# Patient Record
Sex: FEMALE | Race: OTHER | Employment: FULL TIME | URBAN - METROPOLITAN AREA
[De-identification: names, ages, dates, MRNs, and addresses within clinical notes are randomized per-mention and may not be internally consistent; named-entity substitution may affect disease eponyms.]

---

## 2020-09-24 LAB
HBSAG, EXTERNAL: NORMAL
HBSAG, EXTERNAL: NORMAL
HEPATITIS C AB,   EXT: NORMAL
HGB, EXTERNAL: NORMAL
HIV, EXTERNAL: NORMAL
RPR, EXTERNAL: NORMAL
RUBELLA, EXTERNAL: NORMAL
TYPE, ABO & RH, EXTERNAL: NORMAL

## 2020-12-03 PROBLEM — O09.812 HIGH RISK PREGNANCY DUE TO ASSISTED REPRODUCTIVE TECHNOLOGY IN SECOND TRIMESTER: Status: ACTIVE | Noted: 2020-12-03

## 2020-12-03 PROBLEM — O99.281 HYPOTHYROIDISM AFFECTING PREGNANCY IN FIRST TRIMESTER: Status: ACTIVE | Noted: 2020-12-03

## 2020-12-03 PROBLEM — E03.9 HYPOTHYROIDISM AFFECTING PREGNANCY IN FIRST TRIMESTER: Status: ACTIVE | Noted: 2020-12-03

## 2020-12-04 PROBLEM — O99.282 HYPOTHYROIDISM AFFECTING PREGNANCY IN SECOND TRIMESTER: Status: ACTIVE | Noted: 2020-12-03

## 2020-12-07 PROBLEM — O44.02 PLACENTA PREVIA IN SECOND TRIMESTER: Status: ACTIVE | Noted: 2020-12-07

## 2020-12-07 PROBLEM — Z14.8 CARRIER OF FRAGILE X CHROMOSOME: Status: ACTIVE | Noted: 2020-12-07

## 2020-12-07 PROBLEM — O09.522 AMA (ADVANCED MATERNAL AGE) MULTIGRAVIDA 35+, SECOND TRIMESTER: Status: ACTIVE | Noted: 2020-12-07

## 2020-12-07 PROBLEM — O09.512 ADVANCED MATERNAL AGE, 1ST PREGNANCY, SECOND TRIMESTER: Status: ACTIVE | Noted: 2020-12-07

## 2021-01-21 PROBLEM — F32.A DEPRESSION AFFECTING PREGNANCY IN SECOND TRIMESTER, ANTEPARTUM: Status: ACTIVE | Noted: 2021-01-21

## 2021-01-21 PROBLEM — O99.342 DEPRESSION AFFECTING PREGNANCY IN SECOND TRIMESTER, ANTEPARTUM: Status: ACTIVE | Noted: 2021-01-21

## 2021-02-14 ENCOUNTER — HOSPITAL ENCOUNTER (INPATIENT)
Age: 46
LOS: 7 days | Discharge: ANOTHER HOSPITAL | DRG: 771 | End: 2021-02-21
Attending: OBSTETRICS & GYNECOLOGY | Admitting: OBSTETRICS & GYNECOLOGY
Payer: OTHER GOVERNMENT

## 2021-02-14 DIAGNOSIS — O44.13 PLACENTA PREVIA WITH HEMORRHAGE IN THIRD TRIMESTER: ICD-10-CM

## 2021-02-14 PROBLEM — O44.10 ANTEPARTUM HEMORRHAGE FROM PLACENTA PREVIA: Status: ACTIVE | Noted: 2021-02-14

## 2021-02-14 PROBLEM — O46.93 VAGINAL BLEEDING IN PREGNANCY, THIRD TRIMESTER: Status: ACTIVE | Noted: 2021-02-14

## 2021-02-14 LAB
ABO + RH BLD: NORMAL
ANTIBODY SCREEN, EXTERNAL: NORMAL
BLOOD GROUP ANTIBODIES SERPL: NORMAL
ERYTHROCYTE [DISTWIDTH] IN BLOOD BY AUTOMATED COUNT: 13.6 % (ref 11.9–14.6)
HCT VFR BLD AUTO: 34.2 % (ref 35.8–46.3)
HGB BLD-MCNC: 11.8 G/DL (ref 11.7–15.4)
MCH RBC QN AUTO: 28.7 PG (ref 26.1–32.9)
MCHC RBC AUTO-ENTMCNC: 34.5 G/DL (ref 31.4–35)
MCV RBC AUTO: 83.2 FL (ref 79.6–97.8)
NRBC # BLD: 0 K/UL (ref 0–0.2)
PLATELET # BLD AUTO: 400 K/UL (ref 150–450)
PMV BLD AUTO: 9.8 FL (ref 9.4–12.3)
RBC # BLD AUTO: 4.11 M/UL (ref 4.05–5.2)
SPECIMEN EXP DATE BLD: NORMAL
TYPE, ABO & RH, EXTERNAL: NORMAL
WBC # BLD AUTO: 10.9 K/UL (ref 4.3–11.1)

## 2021-02-14 PROCEDURE — 59025 FETAL NON-STRESS TEST: CPT

## 2021-02-14 PROCEDURE — 99255 IP/OBS CONSLTJ NEW/EST HI 80: CPT | Performed by: OBSTETRICS & GYNECOLOGY

## 2021-02-14 PROCEDURE — 85027 COMPLETE CBC AUTOMATED: CPT

## 2021-02-14 PROCEDURE — 74011250637 HC RX REV CODE- 250/637: Performed by: OBSTETRICS & GYNECOLOGY

## 2021-02-14 PROCEDURE — 86901 BLOOD TYPING SEROLOGIC RH(D): CPT

## 2021-02-14 PROCEDURE — 99218 HC RM OBSERVATION: CPT

## 2021-02-14 PROCEDURE — 96372 THER/PROPH/DIAG INJ SC/IM: CPT

## 2021-02-14 PROCEDURE — 81002 URINALYSIS NONAUTO W/O SCOPE: CPT | Performed by: OBSTETRICS & GYNECOLOGY

## 2021-02-14 PROCEDURE — 96360 HYDRATION IV INFUSION INIT: CPT

## 2021-02-14 PROCEDURE — 74011250636 HC RX REV CODE- 250/636: Performed by: OBSTETRICS & GYNECOLOGY

## 2021-02-14 PROCEDURE — 2709999900 HC NON-CHARGEABLE SUPPLY

## 2021-02-14 PROCEDURE — 36415 COLL VENOUS BLD VENIPUNCTURE: CPT

## 2021-02-14 PROCEDURE — 76815 OB US LIMITED FETUS(S): CPT

## 2021-02-14 PROCEDURE — 65270000029 HC RM PRIVATE

## 2021-02-14 PROCEDURE — 99285 EMERGENCY DEPT VISIT HI MDM: CPT | Performed by: OBSTETRICS & GYNECOLOGY

## 2021-02-14 PROCEDURE — 75810000275 HC EMERGENCY DEPT VISIT NO LEVEL OF CARE

## 2021-02-14 RX ORDER — ONDANSETRON 2 MG/ML
4 INJECTION INTRAMUSCULAR; INTRAVENOUS
Status: DISCONTINUED | OUTPATIENT
Start: 2021-02-14 | End: 2021-02-21

## 2021-02-14 RX ORDER — NIFEDIPINE 10 MG/1
10 CAPSULE ORAL
Status: COMPLETED | OUTPATIENT
Start: 2021-02-14 | End: 2021-02-14

## 2021-02-14 RX ORDER — SERTRALINE HYDROCHLORIDE 100 MG/1
100 TABLET, FILM COATED ORAL
Status: DISCONTINUED | OUTPATIENT
Start: 2021-02-14 | End: 2021-02-15

## 2021-02-14 RX ORDER — SODIUM CHLORIDE, SODIUM LACTATE, POTASSIUM CHLORIDE, CALCIUM CHLORIDE 600; 310; 30; 20 MG/100ML; MG/100ML; MG/100ML; MG/100ML
500 INJECTION, SOLUTION INTRAVENOUS CONTINUOUS
Status: DISPENSED | OUTPATIENT
Start: 2021-02-14 | End: 2021-02-14

## 2021-02-14 RX ORDER — ACETAMINOPHEN 325 MG/1
650 TABLET ORAL
Status: DISCONTINUED | OUTPATIENT
Start: 2021-02-14 | End: 2021-02-21

## 2021-02-14 RX ORDER — CALCIUM CARBONATE 200(500)MG
1000 TABLET,CHEWABLE ORAL ONCE
Status: COMPLETED | OUTPATIENT
Start: 2021-02-14 | End: 2021-02-14

## 2021-02-14 RX ORDER — SODIUM CHLORIDE 0.9 % (FLUSH) 0.9 %
5-40 SYRINGE (ML) INJECTION EVERY 8 HOURS
Status: DISCONTINUED | OUTPATIENT
Start: 2021-02-14 | End: 2021-02-21

## 2021-02-14 RX ORDER — GUAIFENESIN 100 MG/5ML
162 LIQUID (ML) ORAL DAILY
Status: DISCONTINUED | OUTPATIENT
Start: 2021-02-15 | End: 2021-02-21

## 2021-02-14 RX ORDER — NIFEDIPINE 10 MG/1
10 CAPSULE ORAL 2 TIMES DAILY
Status: DISCONTINUED | OUTPATIENT
Start: 2021-02-14 | End: 2021-02-14

## 2021-02-14 RX ORDER — LORAZEPAM 1 MG/1
1 TABLET ORAL
Status: DISCONTINUED | OUTPATIENT
Start: 2021-02-14 | End: 2021-02-21

## 2021-02-14 RX ORDER — LEVOTHYROXINE SODIUM 100 UG/1
100 TABLET ORAL
Status: DISCONTINUED | OUTPATIENT
Start: 2021-02-15 | End: 2021-02-15

## 2021-02-14 RX ORDER — MELATONIN
2000 DAILY
Status: DISCONTINUED | OUTPATIENT
Start: 2021-02-15 | End: 2021-02-21

## 2021-02-14 RX ORDER — NIFEDIPINE 10 MG/1
20 CAPSULE ORAL EVERY 6 HOURS
Status: DISCONTINUED | OUTPATIENT
Start: 2021-02-14 | End: 2021-02-17

## 2021-02-14 RX ORDER — SODIUM CHLORIDE 0.9 % (FLUSH) 0.9 %
5-40 SYRINGE (ML) INJECTION AS NEEDED
Status: DISCONTINUED | OUTPATIENT
Start: 2021-02-14 | End: 2021-02-21

## 2021-02-14 RX ORDER — SODIUM CHLORIDE, SODIUM LACTATE, POTASSIUM CHLORIDE, CALCIUM CHLORIDE 600; 310; 30; 20 MG/100ML; MG/100ML; MG/100ML; MG/100ML
500 INJECTION, SOLUTION INTRAVENOUS
Status: COMPLETED | OUTPATIENT
Start: 2021-02-14 | End: 2021-02-14

## 2021-02-14 RX ORDER — CALCIUM CARBONATE 200(500)MG
1000 TABLET,CHEWABLE ORAL DAILY
Status: DISCONTINUED | OUTPATIENT
Start: 2021-02-15 | End: 2021-02-21

## 2021-02-14 RX ORDER — BETAMETHASONE SODIUM PHOSPHATE AND BETAMETHASONE ACETATE 3; 3 MG/ML; MG/ML
12 INJECTION, SUSPENSION INTRA-ARTICULAR; INTRALESIONAL; INTRAMUSCULAR; SOFT TISSUE EVERY 24 HOURS
Status: COMPLETED | OUTPATIENT
Start: 2021-02-14 | End: 2021-02-15

## 2021-02-14 RX ORDER — DEXTROSE, SODIUM CHLORIDE, SODIUM LACTATE, POTASSIUM CHLORIDE, AND CALCIUM CHLORIDE 5; .6; .31; .03; .02 G/100ML; G/100ML; G/100ML; G/100ML; G/100ML
125 INJECTION, SOLUTION INTRAVENOUS CONTINUOUS
Status: DISCONTINUED | OUTPATIENT
Start: 2021-02-14 | End: 2021-02-14

## 2021-02-14 RX ORDER — DOCUSATE SODIUM 100 MG/1
100 CAPSULE, LIQUID FILLED ORAL 2 TIMES DAILY
Status: DISCONTINUED | OUTPATIENT
Start: 2021-02-14 | End: 2021-02-21

## 2021-02-14 RX ADMIN — DOCUSATE SODIUM 100 MG: 100 CAPSULE, LIQUID FILLED ORAL at 12:20

## 2021-02-14 RX ADMIN — SODIUM CHLORIDE, SODIUM LACTATE, POTASSIUM CHLORIDE, CALCIUM CHLORIDE, AND DEXTROSE MONOHYDRATE 125 ML/HR: 600; 310; 30; 20; 5 INJECTION, SOLUTION INTRAVENOUS at 08:41

## 2021-02-14 RX ADMIN — NIFEDIPINE 20 MG: 10 CAPSULE ORAL at 22:28

## 2021-02-14 RX ADMIN — NIFEDIPINE 10 MG: 10 CAPSULE ORAL at 08:32

## 2021-02-14 RX ADMIN — NIFEDIPINE 10 MG: 10 CAPSULE ORAL at 12:20

## 2021-02-14 RX ADMIN — NIFEDIPINE 10 MG: 10 CAPSULE ORAL at 01:36

## 2021-02-14 RX ADMIN — LORAZEPAM 1 MG: 1 TABLET ORAL at 01:36

## 2021-02-14 RX ADMIN — SODIUM CHLORIDE, SODIUM LACTATE, POTASSIUM CHLORIDE, AND CALCIUM CHLORIDE 500 ML/HR: 600; 310; 30; 20 INJECTION, SOLUTION INTRAVENOUS at 01:40

## 2021-02-14 RX ADMIN — CALCIUM CARBONATE 1000 MG: 500 TABLET, CHEWABLE ORAL at 19:09

## 2021-02-14 RX ADMIN — DOCUSATE SODIUM 100 MG: 100 CAPSULE, LIQUID FILLED ORAL at 19:09

## 2021-02-14 RX ADMIN — SODIUM CHLORIDE, SODIUM LACTATE, POTASSIUM CHLORIDE, AND CALCIUM CHLORIDE 500 ML: 600; 310; 30; 20 INJECTION, SOLUTION INTRAVENOUS at 08:41

## 2021-02-14 RX ADMIN — NIFEDIPINE 20 MG: 10 CAPSULE ORAL at 15:54

## 2021-02-14 RX ADMIN — SERTRALINE HYDROCHLORIDE 100 MG: 100 TABLET ORAL at 22:28

## 2021-02-14 RX ADMIN — BETAMETHASONE SODIUM PHOSPHATE AND BETAMETHASONE ACETATE 12 MG: 3; 3 INJECTION, SUSPENSION INTRA-ARTICULAR; INTRALESIONAL; INTRAMUSCULAR at 01:36

## 2021-02-14 NOTE — CONSULTS
Neonatology Prenatal Consult:    Prenatal Consult was requested by the obstetrician. Obstetrical Note:  Medical record and notes reviewed. Obstetrical Findings:      Mother's Date of Admission: 2021 12:25 AM   Age: 39 y.o.  BETZY: Estimated Date of Delivery: 21  Gestation by dates: 29w3d   Pregnancy:   Membrane status: Membrane Status: Intact      Social History     Socioeconomic History    Marital status:      Spouse name: Not on file    Number of children: Not on file    Years of education: Not on file    Highest education level: Not on file   Tobacco Use    Smoking status: Former Smoker     Packs/day: 0.25     Quit date: 2011     Years since quittin.4    Smokeless tobacco: Never Used   Substance and Sexual Activity    Alcohol use: Not Currently     Comment: occ    Drug use: No    Sexual activity: Yes     Partners: Male     Birth control/protection: Pill     Current Facility-Administered Medications   Medication Dose Route Frequency    sodium chloride (NS) flush 5-40 mL  5-40 mL IntraVENous Q8H    sodium chloride (NS) flush 5-40 mL  5-40 mL IntraVENous PRN    acetaminophen (TYLENOL) tablet 650 mg  650 mg Oral Q4H PRN    betamethasone (CELESTONE) injection 12 mg  12 mg IntraMUSCular Q24H    ondansetron (ZOFRAN) injection 4 mg  4 mg IntraVENous Q4H PRN    docusate sodium (COLACE) capsule 100 mg  100 mg Oral BID    LORazepam (ATIVAN) tablet 1 mg  1 mg Oral QHS PRN    [START ON 2/15/2021] levothyroxine (SYNTHROID) tablet 100 mcg  100 mcg Oral 6am    sertraline (ZOLOFT) tablet 100 mg  100 mg Oral QHS    NIFEdipine (PROCARDIA) capsule 20 mg  20 mg Oral Q6H     Patient Active Problem List    Diagnosis Date Noted    Vaginal bleeding in pregnancy, third trimester 2021    Placenta previa with hemorrhage in third trimester 2021    Antepartum hemorrhage from placenta previa 2021    Depression affecting pregnancy in second trimester, antepartum 2021    AMA (advanced maternal age) multigravida 33+, second trimester 2020    Placenta previa in second trimester 2020    Hypothyroidism affecting pregnancy in second trimester 2020    High risk pregnancy due to assisted reproductive technology in second trimester 2020       Lab Results   Component Value Date/Time    ABO/Rh(D) A POSITIVE 2021 01:16 AM    Antibody screen NEG 2021 01:16 AM        Items below were discussed with the parents:      Neonatology coverage reviewed. Survival is very good to excellent. This improves with advancing gestational age. Expected LOS, dependent on gestational age and maturity skills reviewed.  resuscitation team attendance reviewed. Admissions procedures were explained. Family visitation policy were explained. RDS, at some risk. This risk decreases with advancing gestational age. Management of RDS was reviewed. Sepsis evaluation was explained. Feeding techniques reviewed. Mother plans to provide breast milk, yes. The benefits of breast milk were discussed in detail, and breast milk feedings is strongly recommended. Lactation services were also reviewed. Gut problems, NEC and infections are lessened with breast milk feedings. Significant IVH and ROP are at a low risk as compared to the extreme  gestation. This risk decreases with advancing gestational age. School readiness and learning problems are at a low but increased risk as compared to the full term gestation. Asthma-like problems later in life is at a low but increased risk as compared to term gestation. An increased risk is found with a family history or with smoking. Significant morbidity or complications are a low occurrence for a \"late \" , but with the potential for an extended hospital stay because of temperature maintenance and poor feeding skills. If male , have discussed potential for circumcision.  Risks and benefits explained. Family advised to think carefully about this procedure before consenting. SIDS and safe infant sleep practices were reviewed. Family advised to maintain their flu and pertussis vaccinations. Local or primary pediatrician: to be determined. Recommendations: The state  regionalization guidelines were reviewed. The timing and place of delivery is determined by the obstetrical staff. The subsequent appropriate level of  care is determined by the  staff. It is appropriate for the infant to be receive care here in our  Care Unit, if after evaluation of the  infant, it is determined that greater than 32 weeks gestation and greater than 1500 gm, or that a higher level of care is not required, or with consultation with the level III  C. If infant needs NCU care, then will plan to admit to the neonatology service. Attestation:     Total consultation time was 40 minutes with over 50% of the total time was spent in counseling or coordination of care. This included prognosis, risks and benefits of management (treatment) options, importance of compliance with chosen management (treatment) options, and patient and family education.         Signed: Cathy Valdes MD  Today's Date: 2021

## 2021-02-14 NOTE — PROGRESS NOTES
This note also relates to the following rows which could not be included:  Pulse (Heart Rate) - Cannot attach notes to unvalidated device data  BP - Cannot attach notes to unvalidated device data  MAP (Monitor) - Cannot attach notes to unvalidated device data       02/14/21 1221   Maternal Vital Signs   Temp 98.4 °F (36.9 °C)   Temp Source Oral

## 2021-02-14 NOTE — PROGRESS NOTES
RN to bedside. Pt OOB to toilet. States she can still feel the tightening in her abdomen but that it is very infrequent now and much less noticeable. Pt and SO denies needs and concerns at this time. Encouraged to call out PRN. Pt voiced understanding.

## 2021-02-14 NOTE — PROGRESS NOTES
02/14/21 1421   Maternal Vital Signs   Temp 98 °F (36.7 °C)   Temp Source Oral   Pulse (Heart Rate) 86   BP 99/62   MAP (Monitor) 75   MAP (Calculated) 74   RN @ bedside. Pt states cramping has decreased and when she feels them they are \"not as strong. \" Denies vaginal bleeding at this time.

## 2021-02-14 NOTE — PROGRESS NOTES
0805 EFM applied to soft non-tender abd. Fetal movement present @ time of application. Within 5 min, notable contractions when pt is c/o \"hardeding of the abdomen. \"   Elijah Zuluaga called and updated on contractions. Telephone orders to start D5LR @ 125/hr, 500 cc LR bolus, 10 mg Nifedipine, and NPO status. POC discussed with pt and SO. Both expressed understanding and questions were answered. Pt and SO question if pt would be able to get a note to excuse her from daily labor of work and have her work remotely from home. Mentioned to Dr. Gurjit Zuluaga for her to f/u on. Pt encouraged to call out PRN. Voiced understanding. [address] [facility] removed. TOCO remains in place to monitor contractions.  baseline. Somewhat broken tracings due to continuous fetal activity. RN @ bedside many times to adjust US but fetal movement nearly immediately makes tracing difficult.

## 2021-02-14 NOTE — PROGRESS NOTES
High Risk Obstetrics Progress Note    Name: Ned Grajeda MRN: [id number]  SSN: [id number]    YOB: 1975  Age: 39 y.o. Sex: female      Subjective:      LOS: 0 days    Estimated Date of Delivery: 21   Gestational Age Today: 29w3d     Patient admitted for vaginal bleeding. States she does have mild vaginal bleeding. Pt has h/o of placenta previa. Objective:     Vitals:  Blood pressure (!) 97/53, pulse 93, temperature 98.3 °F (36.8 °C), resp. rate 16, height 5' 5\" (1.651 m), weight 79.4 kg (175 lb), last menstrual period 2020. Temp (24hrs), Av.6 °F (37 °C), Min:98.3 °F (36.8 °C), Max:98.9 °F (14.6 °C)    Systolic (89VNM), QCH:557 , Min:97 , KMQ:788      Diastolic (82SSA), RTQ:55, Min:53, Max:74       Intake and Output:     Date 21 0700 - 02/15/21 0659   Shift 0177-0132 8202-2664 8859-1807 24 Hour Total   INTAKE   I.V.(mL/kg/hr) 500   500   Shift Total(mL/kg) 500(6.3)   500(6.3)   OUTPUT   Shift Total(mL/kg)       Weight (kg) 79.4 79.4 79.4 79.4       Physical Exam:  Patient without distress.   Heart: Regular rate and rhythm  Lung: clear to auscultation throughout lung fields, no wheezes, no rales, no rhonchi and normal respiratory effort  Abdomen: soft, nontender  Fundus: soft and non tender       Membranes:  Intact    Uterine Activity:  Some irritability    Fetal Heart Rate:  Reactive        Labs:   Recent Results (from the past 36 hour(s))   TYPE & SCREEN    Collection Time: 21  1:16 AM   Result Value Ref Range    Crossmatch Expiration 2021,2359     ABO/Rh(D) A POSITIVE     Antibody screen NEG    CBC W/O DIFF    Collection Time: 21  1:16 AM   Result Value Ref Range    WBC 10.9 4.3 - 11.1 K/uL    RBC 4.11 4.05 - 5.2 M/uL    HGB 11.8 11.7 - 15.4 g/dL    HCT 34.2 (L) 35.8 - 46.3 %    MCV 83.2 79.6 - 97.8 FL    MCH 28.7 26.1 - 32.9 PG    MCHC 34.5 31.4 - 35.0 g/dL    RDW 13.6 11.9 - 14.6 %    PLATELET 757 055 - 458 K/uL    MPV 9.8 9.4 - 12.3 FL    ABSOLUTE NRBC 0.00 0.0 - 0.2 K/uL       Assessment and Plan: Active Problems:    Vaginal bleeding in pregnancy, third trimester (2/14/2021)      Placenta previa with hemorrhage in third trimester (2/14/2021)       Pt with brown blood on pad this am and minimal small (3 -4 in an hour) ctx /irritability. S/p Celestone dose #1. Have given 2 does of Procardia. Will start on maintenance. Discussed that she will need to be tx to S if labor ensues since she is less than 32w. She is stable for now and MFM will be seeing later.

## 2021-02-14 NOTE — CONSULTS
Maternal Fetal Medicine Inpatient Consult Note    Consulting: Dr. Niko Butler    Chief Complaint:  Pregnancy and vaginal bleeding . History of Present Illness: 39 y.o.  at 28w2d gestation who presents with painless bleeding last night. This has stopped, just recent void, no blood or discharge. Having Uterine tightening now, irregular. This am was having regular contractions that responded to IV fluids and Procardia. Patient admitted for placenta previa known placenta posterior placenta previa. She complains of some tightening. Patient denies HA, abdominal pain, vision changes. No regular contractions, LOF, VB. Good FM. Minimal edema. No chest pain or shortness of breath. No significant reflux, nausea, constipation, or other GI complaints. Review of Systems:  A comprehensive review of systems was negative except for that written in the HPI.      History:  OB History    Para Term  AB Living   6       5     SAB TAB Ectopic Molar Multiple Live Births   5                # Outcome Date GA Lbr Teofilo/2nd Weight Sex Delivery Anes PTL Lv   6 Current            5 2017 8w0d             Birth Comments: D&C   4 2016 8w0d             Birth Comments: D&C   3 2015 8w0d             Birth Comments: [place]   2 2014 8w0d             Birth Comments: [place], [place]   1 2013 12w0d             Birth Comments: D&C, [place]       Past Surgical History:   Procedure Laterality Date    APPENDECTOMY,W OTHR C      HX APPENDECTOMY         Past Medical History:   Diagnosis Date    Anemia     iron supplements as a child    Anxiety     was on celexa    Blighted ovum 2011    Hypothyroidism affecting pregnancy in first trimester 12/3/2020    Infertility, female     Psychiatric disorder     depression       Family History   Problem Relation Age of Onset    Diabetes Maternal Grandmother     Stroke Maternal Grandmother        Social History     Socioeconomic History    Marital status:      Spouse name: Not on file    Number of children: Not on file    Years of education: Not on file    Highest education level: Not on file   Occupational History    Not on file   Social Needs    Financial resource strain: Not on file    Food insecurity     Worry: Not on file     Inability: Not on file    Transportation needs     Medical: Not on file     Non-medical: Not on file   Tobacco Use    Smoking status: Former Smoker     Packs/day: 0.25     Quit date: 2011     Years since quittin.4    Smokeless tobacco: Never Used   Substance and Sexual Activity    Alcohol use: Not Currently     Comment: occ    Drug use: No    Sexual activity: Yes     Partners: Male     Birth control/protection: Pill   Lifestyle    Physical activity     Days per week: Not on file     Minutes per session: Not on file    Stress: Not on file   Relationships    Social connections     Talks on phone: Not on file     Gets together: Not on file     Attends Advent service: Not on file     Active member of club or organization: Not on file     Attends meetings of clubs or organizations: Not on file     Relationship status: Not on file    Intimate partner violence     Fear of current or ex partner: Not on file     Emotionally abused: Not on file     Physically abused: Not on file     Forced sexual activity: Not on file   Other Topics Concern    Not on file   Social History Narrative    Not on file       Allergies   Allergen Reactions    Niacin Rash         Current Facility-Administered Medications:     sodium chloride (NS) flush 5-40 mL, 5-40 mL, IntraVENous, Q8H, Scott Castillo MD    sodium chloride (NS) flush 5-40 mL, 5-40 mL, IntraVENous, PRN, Scott Castillo MD    acetaminophen (TYLENOL) tablet 650 mg, 650 mg, Oral, Q4H PRN, Scott Castillo MD    betamethasone (CELESTONE) injection 12 mg, 12 mg, IntraMUSCular, Q24H, Scott Castillo MD, 12 mg at 21 0136   ondansetron (ZOFRAN) injection 4 mg, 4 mg, IntraVENous, Q4H PRN, Leila Castillo MD    docusate sodium (COLACE) capsule 100 mg, 100 mg, Oral, BID, Leila Castillo MD, 100 mg at 02/14/21 1220    LORazepam (ATIVAN) tablet 1 mg, 1 mg, Oral, QHS PRN, Ibis Correa MD, 1 mg at 02/14/21 0136    [START ON 2/15/2021] levothyroxine (SYNTHROID) tablet 100 mcg, 100 mcg, Oral, 6am, Maximilian Cordoba MD    sertraline (ZOLOFT) tablet 100 mg, 100 mg, Oral, QHS, Shalini Parikh MD    NIFEdipine (PROCARDIA) capsule 20 mg, 20 mg, Oral, Q6H, Virgen Kraft MD, 20 mg at 02/14/21 1554    Objective:     Vitals:     Patient Vitals for the past 24 hrs:   Temp Pulse Resp BP   02/14/21 1421 98 °F (36.7 °C) 86  99/62   02/14/21 1221 98.4 °F (36.9 °C) 88  106/67   02/14/21 0751 98.3 °F (36.8 °C) 93 16 (!) 97/53   02/14/21 0044 98.9 °F (37.2 °C) 83 22 105/74        I&O:   02/14 0701 - 02/14 1900  In: 500 [I.V.:500]  Out: -              No intake/output data recorded. Output by Drain (mL) 02/12/21 0701 - 02/12/21 1900 02/12/21 1901 - 02/13/21 0700 02/13/21 0701 - 02/13/21 1900 02/13/21 1901 - 02/14/21 0700 02/14/21 0701 - 02/14/21 1608   Patient has no LDAs of requested type attached. Exam:   Constitutional: Patient without distress. HEENT: Symmetric without facial palsy, uncorrected poor hearing or uncorrected poor vision. No thyroid enlargement or goiter  Chest: No use of accessory muscles or excessive work of breathing  Lungs: CTAB  Heart:  regular rate and rhythm  Abdomen: gravid, soft, non-tender without masses. Bowel sounds normal.   Fundus: soft and non tender  Right Upper Quadrant: non-tender  Genitourinary: deferred as without complaints of labor or ROM  Cervical Exam:      Membranes: Membrane Status: Intact  Lower Extremities:  Edema: No bilaterally  Patellar Reflexes: 1+ bilaterally  Clonus: absent  Skin: normal coloration and turgor, no rashes, no suspicious skin lesions noted.    Neurologic: AOx3. Gait normal. Reflexes and motor strength normal and symmetric. Cranial nerves 2-12 and sensation grossly intact. Psychiatric: non focal    Maternal and Fetal Monitoring:                              Uterine Activity: Frequency (min): x2 Intensity: Mild  Fetal Heart Rate: Mode: ExternalFetal Heart Rate: 135      Labs:   CBC:    Recent Labs     21  0116   WBC 10.9   HGB 11.8   HCT 34.2*          Imaging:     Assessment and Plan:  39 y.o.  at 29w3d with   Patient Active Problem List    Diagnosis    Placenta previa in second trimester     2020 [facility]: Posterior placenta covers internal os posteriorly. 2021 at [facility]- Placental edge still covering internal os. NO findings of PAS. Still has potential to resolve term. 2021 Inpatient Consult- Patient presented last night with painless vaginal bleeding, was having contractions. No further bleeding, irregular contractions. Reassuring fetal status, no sign of abruption. Explained findings and plan below. Called [facility] and discussed with M there, Dr. Sahra Altamirano. She agrees with management at [facility] at the current time. · Finish course of steroids. · Observe another 48 hours in hospital if no bleeding. · Neonatologist called about consult to talk to patient. · If PTL/Bleeding occurs again, may need to transfer to [facility]. · Increase to Procardia 20 mgs po q 6 hours. · Treat with MgSulfate for another episode of bleeding.  High risk pregnancy due to assisted reproductive technology in second trimester     donor egg 29 yrs old,  sperm  2020 at [facility]: Normal anatomy and echo; partial previa. AC 66%, CL 3.6  2021 at [facility]: Appropriate fetal growth, marginal previa. AC 68%, Overall 51%, ENOCH 22 cm (generous), CL 3.8 cm. · F/U MFM 4 weeks growth, placenta, repeat echo. · Glucola next week in OB office.   · Low dose Aspirin 162 mg daily is recommended to be started at 12-16 weeks (some benefit seen with starting up to 28 weeks) for the prevention of preeclampsia in high risk women.  (U.S. Preventative Services Task Force, Annals of Internal Medicine 2015)  · Stop Aspirin at 36 weeks. · Start Vitamin D 2000IU daily and Calcium 1000mg daily (or may take Viactiv calcium chews x 2 per day) to aid in protection of bones and teeth.  Depression affecting pregnancy in second trimester, antepartum     1/21/2021 at [facility]: Patient struggles with depression. States that it is manageable at this time but she is low on energy. She is going to counseling. She has discussed medication with her counselor and she recommended Prozac to her. She doesn't want to start any medication until 3rd trimester. Discussed with patient that she may be at increased risk for postpartum depression. Recommend pt to start medication at this time. · Start Zoloft 100 mg QHS, 1/2 tablet for 7 days, increase to full tablet afterwards        Hypothyroidism affecting pregnancy in second trimester     12/7/2020 at [facility]: Takes synthroid 88mcg po daily  1/21/2021 at [facility]: Increased levothyroxine 112mcg po daily per pregnancy recommendations for all Hypothyroid patients. · Recommend evaluation of TSH each trimester. Goal to keep TSH <2-2.5. · If medication change, repeat TFTs in 4 weeks. · If poorly controlled thyroid, recommend growth evaluation in 3rd trimester.  AMA (advanced maternal age) multigravida 33+, second trimester     SEE HRP OVERVIEW      Vaginal bleeding in pregnancy, third trimester    Placenta previa with hemorrhage in third trimester       Time:110    Minutes spent on floor,with greater than 50% of the time examining patient, explaining plan and coordinating care with nurse and requesting primary physician.       Adolfo Fitch MD   2/14/2021

## 2021-02-14 NOTE — PROGRESS NOTES
RN to bedside. Shift assessment co,pleted at this time. Dark red dried spotting noted on jackelin pad. Pt states that each time she voids and wipes there is bright red blood on tissue. Pt also states that she has noticed decreased fetal movement and some \"hardening\" of her abdomen. She has requested to get OOB to void at this time. Assisted by SO. RN will return in approx 10 min to place pt on EFM. Pt voiced understanding.

## 2021-02-14 NOTE — H&P
History & Physical    Name: Angel Calvillo MRN: [id number]  SSN: [id number]    YOB: 1975  Age: 39 y.o. Sex: female      Subjective:     Reason for Admission:  Pregnancy and Placenta Previa    History of Present Illness: Ms. Mo Bose is a 39 y.o.  with an estimated gestational age of 28w2d with Estimated Date of Delivery: 21. Patient complains of moderate vaginal bleeding. Pt woke with her shorts \"soaked\" but no blood on sheets and then passed a clot in the toilet. No further bleeding. Pregnancy has been complicated by placenta previa. Patient denies abdominal pain  , fever, nausea and vomiting, shortness of breath and swelling. Pt reports she had a quiet day today. Some uterine \"tightening\" but no painful contractions    OB History    Para Term  AB Living   6       5     SAB TAB Ectopic Molar Multiple Live Births   5                # Outcome Date GA Lbr Teofilo/2nd Weight Sex Delivery Anes PTL Lv   6 Current            5 2017 8w0d             Birth Comments: D&C   4 2016 8w0d             Birth Comments: D&C   3 2015 8w0d             Birth Comments: [place]   2 2014 8w0d             Birth Comments: [place], [place]   1 2013 12w0d             Birth Comments: D&C [place]     Past Medical History:   Diagnosis Date    Anemia     iron supplements as a child    Anxiety     was on celexa    Blighted ovum 2011    Hypothyroidism affecting pregnancy in first trimester 12/3/2020    Infertility, female     Psychiatric disorder     depression     Past Surgical History:   Procedure Laterality Date    APPENDECTOMY,W OTHR C      HX APPENDECTOMY       Social History     Occupational History    Not on file   Tobacco Use    Smoking status: Former Smoker     Packs/day: 0.25     Quit date: 2011     Years since quittin.4    Smokeless tobacco: Never Used   Substance and Sexual Activity    Alcohol use: Not Currently     Comment: occ    Drug use:  No  Sexual activity: Yes     Partners: Male     Birth control/protection: Pill      Family History   Problem Relation Age of Onset    Diabetes Maternal Grandmother     Stroke Maternal Grandmother        No Known Allergies  Prior to Admission medications    Medication Sig Start Date End Date Taking? Authorizing Provider   sertraline (ZOLOFT) 100 mg tablet Take 1 Tab by mouth nightly. Start 1/2 tab for 7 days then increase to 1 tablet 21  Yes Mario Davila MD   levothyroxine (SYNTHROID) 112 mcg tablet Take 1 Tab by mouth Daily (before breakfast). 21  Yes Mario Davila MD   aspirin delayed-release 81 mg tablet Take  by mouth daily. Yes Provider, Historical   cholecalciferol, vitamin D3, (Vitamin D3) 50 mcg (2,000 unit) tab Take  by mouth. Yes Provider, Historical   calcium carbonate (TUMS) 200 mg calcium (500 mg) chew Take 1 Tab by mouth daily. Yes Provider, Historical   prenatal vit-iron fumarate-fa (Right Step Prenatal Vitamins) 27 mg iron- 0.8 mg tab tablet Take 1 Tab by mouth daily. Yes Provider, Historical   acetaminophen (Tylenol Extra Strength) 500 mg tablet Take  by mouth every six (6) hours as needed for Pain. Provider, Historical        Review of Systems:  A comprehensive review of systems was negative except for that written in the History of Present Illness. A 12 point review of systems negative    Objective:     Vitals:    Vitals:    21 0044 21 0046   BP: 105/74    Pulse: 83    Resp: 22    Temp: 98.9 °F (37.2 °C)    Weight:  79.4 kg (175 lb)   Height:  5' 5\" (1.651 m)      Temp (24hrs), Av.9 °F (37.2 °C), Min:98.9 °F (37.2 °C), Max:98.9 °F (37.2 °C)    BP  Min: 105/74  Max: 105/74     Physical Exam:  Patient without distress.   Heart: Regular rate and rhythm  Lung: clear to auscultation throughout lung fields, no wheezes, no rales, no rhonchi and normal respiratory effort  Back: costovertebral angle tenderness absent  Abdomen: soft, nontender  Fundus: soft and non tender  Perineum: blood absent, amniotic fluid absent  Cervical Exam: deferred  Lower Extremities:  - Edema No   - Patellar Reflexes: 2+ bilaterally     Membranes:  Intact  Uterine Activity:  irritability  Fetal Heart Rate:  Baseline: 150 per minute  Variability: moderate  Accelerations: yes       Bedside ultrasound- vertex  bpp 8/8  Fetal breathing, tone, and lots of movement seen immediately  rut = 16    Lab/Data Review:  No results found for this or any previous visit (from the past 24 hour(s)). Assessment and Plan:      Active Problems:    Vaginal bleeding in pregnancy, third trimester (2/14/2021)      Placenta previa with hemorrhage in third trimester (2/14/2021)    will get CBC, T&S  Monitor closely  Betamethasone for fetal lung maturity  No current bleeding  Having some uterine irritability- no painful contractions- will get nifedipine and monitor

## 2021-02-14 NOTE — PROGRESS NOTES
02/14/21 1221   Maternal Vital Signs   Temp 98.4 °F (36.9 °C)   Temp Source Oral   Pulse (Heart Rate) 88   /67   MAP (Monitor) 82   MAP (Calculated) 80   RN @ bedside. Pt resting in bed. States cramping is a little less.

## 2021-02-14 NOTE — PROGRESS NOTES
Patient up to bathroom denies any bleeding at this time has not had any bleeding since came small amount noted on tissue when she wipes.

## 2021-02-14 NOTE — PROGRESS NOTES
Patient from ER via wheelchair. Patient states that she is a previa that she was watching movies went to bathroom where her pants per patient saturated with blood states that she passed a large clot then no more bleeding.

## 2021-02-15 LAB
GLUCOSE, GLUUPC: NEGATIVE
KETONES UR-MCNC: NEGATIVE MG/DL
PROT UR QL: NEGATIVE

## 2021-02-15 PROCEDURE — 74011250637 HC RX REV CODE- 250/637: Performed by: OBSTETRICS & GYNECOLOGY

## 2021-02-15 PROCEDURE — 65270000029 HC RM PRIVATE

## 2021-02-15 PROCEDURE — 59025 FETAL NON-STRESS TEST: CPT

## 2021-02-15 PROCEDURE — 74011250636 HC RX REV CODE- 250/636: Performed by: OBSTETRICS & GYNECOLOGY

## 2021-02-15 PROCEDURE — 2709999900 HC NON-CHARGEABLE SUPPLY

## 2021-02-15 RX ORDER — SERTRALINE HYDROCHLORIDE 100 MG/1
50 TABLET, FILM COATED ORAL
Status: DISCONTINUED | OUTPATIENT
Start: 2021-02-16 | End: 2021-02-18

## 2021-02-15 RX ORDER — LEVOTHYROXINE SODIUM 100 UG/1
100 TABLET ORAL
Status: DISCONTINUED | OUTPATIENT
Start: 2021-02-15 | End: 2021-02-15

## 2021-02-15 RX ORDER — LANOLIN ALCOHOL/MO/W.PET/CERES
600 CREAM (GRAM) TOPICAL
Status: DISCONTINUED | OUTPATIENT
Start: 2021-02-15 | End: 2021-02-21

## 2021-02-15 RX ORDER — ZOLPIDEM TARTRATE 5 MG/1
5 TABLET ORAL
Status: COMPLETED | OUTPATIENT
Start: 2021-02-15 | End: 2021-02-15

## 2021-02-15 RX ORDER — LEVOTHYROXINE SODIUM 100 UG/1
100 TABLET ORAL DAILY
Status: DISCONTINUED | OUTPATIENT
Start: 2021-02-15 | End: 2021-02-21

## 2021-02-15 RX ORDER — ZOLPIDEM TARTRATE 5 MG/1
5 TABLET ORAL
Status: DISCONTINUED | OUTPATIENT
Start: 2021-02-16 | End: 2021-02-21

## 2021-02-15 RX ADMIN — ASPIRIN 81 MG 162 MG: 81 TABLET ORAL at 09:32

## 2021-02-15 RX ADMIN — VITAMIN D, TAB 1000IU (100/BT) 2000 UNITS: 25 TAB at 09:32

## 2021-02-15 RX ADMIN — BETAMETHASONE SODIUM PHOSPHATE AND BETAMETHASONE ACETATE 12 MG: 3; 3 INJECTION, SUSPENSION INTRA-ARTICULAR; INTRALESIONAL; INTRAMUSCULAR at 00:38

## 2021-02-15 RX ADMIN — NIFEDIPINE 20 MG: 10 CAPSULE ORAL at 09:32

## 2021-02-15 RX ADMIN — NIFEDIPINE 20 MG: 10 CAPSULE ORAL at 15:53

## 2021-02-15 RX ADMIN — ZOLPIDEM TARTRATE 5 MG: 5 TABLET ORAL at 22:54

## 2021-02-15 RX ADMIN — CALCIUM CARBONATE 1000 MG: 500 TABLET, CHEWABLE ORAL at 09:32

## 2021-02-15 RX ADMIN — DOCUSATE SODIUM 100 MG: 100 CAPSULE, LIQUID FILLED ORAL at 09:32

## 2021-02-15 RX ADMIN — LEVOTHYROXINE SODIUM 100 MCG: 0.1 TABLET ORAL at 08:41

## 2021-02-15 RX ADMIN — NIFEDIPINE 20 MG: 10 CAPSULE ORAL at 04:02

## 2021-02-15 RX ADMIN — SERTRALINE HYDROCHLORIDE 50 MG: 100 TABLET ORAL at 21:48

## 2021-02-15 RX ADMIN — LORAZEPAM 1 MG: 1 TABLET ORAL at 00:53

## 2021-02-15 RX ADMIN — NIFEDIPINE 20 MG: 10 CAPSULE ORAL at 21:48

## 2021-02-15 RX ADMIN — DOCUSATE SODIUM 100 MG: 100 CAPSULE, LIQUID FILLED ORAL at 18:36

## 2021-02-15 RX ADMIN — Medication 600 MG: at 21:48

## 2021-02-15 NOTE — PROGRESS NOTES
Shift assessment complete as noted. Patient up to shower. IV covered. Questions encouraged and answered. Encouraged to call for needs or concerns. Verbalizes understanding.

## 2021-02-15 NOTE — PROGRESS NOTES
Shift assessment complete. Denies any contractions or pain states no bleeding at this time good fetal movement. Call light in reach. Patient to call out PRN for any wants or needs.

## 2021-02-15 NOTE — PROGRESS NOTES
Patient with eyes closed no complaints spouse sleeping on couch that he has pulled up close to patient bed.

## 2021-02-15 NOTE — PROGRESS NOTES
Bedside report received from Torrie Boast, RN. Patient care assumed. Pt sleeping. No signs of distress noted.

## 2021-02-15 NOTE — PROGRESS NOTES
High Risk Obstetrics Progress Note    Name: Iza Gutierrez MRN: [id number]  SSN: [id number]    YOB: 1975  Age: 45 y.o.  Sex: female      Subjective:      LOS: 1 day    Estimated Date of Delivery: 21   Gestational Age Today: 29w4d     Patient admitted for vaginal bleeding. States she does not have vaginal bleeding .  Bleeding has stopped and she states she feels no ctx.    Objective:     Vitals:  Blood pressure (!) 100/58, pulse 85, temperature 98.2 °F (36.8 °C), resp. rate 16, height 5' 5\" (1.651 m), weight 79.4 kg (175 lb), last menstrual period 2020.   Temp (24hrs), Av.2 °F (36.8 °C), Min:98 °F (36.7 °C), Max:98.4 °F (36.9 °C)    Systolic (24hrs), Av , Min:99 , Max:110      Diastolic (24hrs), Av, Min:58, Max:67       Intake and Output:         Physical Exam:  Patient without distress.  Heart: Regular rate and rhythm  Lung: clear to auscultation throughout lung fields, no wheezes, no rales, no rhonchi and normal respiratory effort  Abdomen: soft, nontender  Fundus: soft and non tender       Membranes:  Intact    Uterine Activity:  Minimal irritability only    Fetal Heart Rate:  Reactive        Labs:   Recent Results (from the past 36 hour(s))   TYPE & SCREEN    Collection Time: 21  1:16 AM   Result Value Ref Range    Crossmatch Expiration 2021,2359     ABO/Rh(D) A POSITIVE     Antibody screen NEG    CBC W/O DIFF    Collection Time: 21  1:16 AM   Result Value Ref Range    WBC 10.9 4.3 - 11.1 K/uL    RBC 4.11 4.05 - 5.2 M/uL    HGB 11.8 11.7 - 15.4 g/dL    HCT 34.2 (L) 35.8 - 46.3 %    MCV 83.2 79.6 - 97.8 FL    MCH 28.7 26.1 - 32.9 PG    MCHC 34.5 31.4 - 35.0 g/dL    RDW 13.6 11.9 - 14.6 %    PLATELET 400 150 - 450 K/uL    MPV 9.8 9.4 - 12.3 FL    ABSOLUTE NRBC 0.00 0.0 - 0.2 K/uL       Assessment and Plan:      Active Problems:    Vaginal bleeding in pregnancy, third trimester (2021)      Placenta previa with hemorrhage in third trimester  (2/14/2021)      Antepartum hemorrhage from placenta previa (2/14/2021)       S/p 2 doses of Celestone. Will continue to monitor in house for 48 hrs. If remains stable, may dc home on modified bedrest tomorrow.

## 2021-02-15 NOTE — PROGRESS NOTES
Pt sitting on side of bed. Pt requested panties (provided) and has a pad in her hand. Pt states she had some spotting and felt like she was yumiko \"a little\". Obey Rodriguez, charge RN aware.

## 2021-02-16 PROCEDURE — 65270000029 HC RM PRIVATE

## 2021-02-16 PROCEDURE — 74011250636 HC RX REV CODE- 250/636: Performed by: OBSTETRICS & GYNECOLOGY

## 2021-02-16 PROCEDURE — 74011250637 HC RX REV CODE- 250/637: Performed by: OBSTETRICS & GYNECOLOGY

## 2021-02-16 PROCEDURE — 59025 FETAL NON-STRESS TEST: CPT

## 2021-02-16 RX ORDER — SODIUM CHLORIDE, SODIUM LACTATE, POTASSIUM CHLORIDE, CALCIUM CHLORIDE 600; 310; 30; 20 MG/100ML; MG/100ML; MG/100ML; MG/100ML
500 INJECTION, SOLUTION INTRAVENOUS ONCE
Status: COMPLETED | OUTPATIENT
Start: 2021-02-16 | End: 2021-02-16

## 2021-02-16 RX ADMIN — NIFEDIPINE 20 MG: 10 CAPSULE ORAL at 18:56

## 2021-02-16 RX ADMIN — LORAZEPAM 1 MG: 1 TABLET ORAL at 22:05

## 2021-02-16 RX ADMIN — CALCIUM CARBONATE 1000 MG: 500 TABLET, CHEWABLE ORAL at 08:49

## 2021-02-16 RX ADMIN — LEVOTHYROXINE SODIUM 100 MCG: 0.1 TABLET ORAL at 08:48

## 2021-02-16 RX ADMIN — NIFEDIPINE 20 MG: 10 CAPSULE ORAL at 13:09

## 2021-02-16 RX ADMIN — SODIUM CHLORIDE, SODIUM LACTATE, POTASSIUM CHLORIDE, AND CALCIUM CHLORIDE 500 ML/HR: 600; 310; 30; 20 INJECTION, SOLUTION INTRAVENOUS at 17:40

## 2021-02-16 RX ADMIN — ASPIRIN 81 MG 162 MG: 81 TABLET ORAL at 08:48

## 2021-02-16 RX ADMIN — Medication 600 MG: at 22:05

## 2021-02-16 RX ADMIN — NIFEDIPINE 20 MG: 10 CAPSULE ORAL at 07:20

## 2021-02-16 RX ADMIN — SERTRALINE HYDROCHLORIDE 50 MG: 100 TABLET ORAL at 22:05

## 2021-02-16 RX ADMIN — VITAMIN D, TAB 1000IU (100/BT) 2000 UNITS: 25 TAB at 08:48

## 2021-02-16 RX ADMIN — DOCUSATE SODIUM 100 MG: 100 CAPSULE, LIQUID FILLED ORAL at 08:49

## 2021-02-16 RX ADMIN — DOCUSATE SODIUM 100 MG: 100 CAPSULE, LIQUID FILLED ORAL at 18:56

## 2021-02-16 NOTE — PROGRESS NOTES
Pt finished breakfast.  Pt states she had scant amt of bright red bleeding after her shower. Dr Cora Johnsties aware. EFM on for NST.

## 2021-02-16 NOTE — PROGRESS NOTES
Chart reviewed - patient admitted at 29w3d with vaginal bleeding. Tonia.Luiss; BETZY: 4/29/21. SW met with patient while social distancing w/appropriate PPE. Patient states that she's coping \"fine\" with hospitalization. She's had the support of her  Tate Hannon) while inpatient. This will be patient's first baby; she's expecting a girl. Patient confirms that she's taking Zoloft, and she started this medication approximately 3 weeks ago due to ongoing depression/anxiety. Per patient, \"It's helping a little. \"  Patient has a counselor, but she hasn't seen her in a while due to \"Covid and my job. \"  Patient can reinitiate therapy with this counselor if needed. Patient denied any needs from  at this time. SW will continue to follow.     JAMES Rousseau  [address] [facility]   [phone]

## 2021-02-16 NOTE — PROGRESS NOTES
Pt complain of uterine tightening. No pain. Pt denies vaginal bleeding since this AM.  EFM on.      1722- Uterine contractions noted every 2-4 min. Mild to palpate. Dr Martha Zaman notified. Orders to give 500 cc bolus. 1725- Pt up to BR.      1737- IV infiltrated. Removed. Tip intact. 1740- IV started in left FA. LR bolus infusing. 1845- No further uterine contractions. Pt denies complaints of tightening. EFM off.     1900- SBAR report given to MARITO Dennis.

## 2021-02-16 NOTE — PROGRESS NOTES
SBAR report received from FERN Sales RN. Care assumed. Pt resting on left side. No distress noted. Will return for assessment.

## 2021-02-16 NOTE — PROGRESS NOTES
SBAR received from Timothy Nuñez RN. Pt resting in bed with SO @ bedside. Pt reports a small amount of bright red bleeding this am after a shower. She states that she \"wiped 3 times and it was bright red, but after during the day it was less and brown. \"     Dr. Merissa Kinsey notified. No new orders received. Shift assessment complete. Pt denies needs.

## 2021-02-16 NOTE — PROGRESS NOTES
High Risk Obstetrics Progress Note    Name: Enzo Fried MRN: [id number]  SSN: [id number]    YOB: 1975  Age: 39 y.o. Sex: female      Subjective:      LOS: 2 days    Estimated Date of Delivery: 21   Gestational Age Today: 34w10d     Patient admitted for vaginal bleeding. States she does have mild vaginal bleeding this am in shower. Objective:     Vitals:  Blood pressure 104/63, pulse 92, temperature 98.5 °F (36.9 °C), resp. rate 18, height 5' 5\" (1.651 m), weight 79.4 kg (175 lb), last menstrual period 2020, SpO2 99 %. Temp (24hrs), Av.2 °F (36.8 °C), Min:97.9 °F (36.6 °C), Max:98.5 °F (49.5 °C)    Systolic (86NBX), XDU:500 , Min:94 , FDC:347      Diastolic (73ZOA), RPR:74, Min:51, Max:68       Intake and Output:         Physical Exam:  Patient without distress. Heart: Regular rate and rhythm  Lung: clear to auscultation throughout lung fields, no wheezes, no rales, no rhonchi and normal respiratory effort  Abdomen: soft, nontender  Fundus: soft and non tender       Membranes:  Intact    Uterine Activity:  None    Fetal Heart Rate:  Reactive        Labs:   Recent Results (from the past 36 hour(s))   POC URINE DIPSTICK MANUAL    Collection Time: 02/15/21 10:30 AM   Result Value Ref Range    Protein (POC) Negative Negative    Glucose, urine (POC) Negative Negative    Ketones (POC) Negative Negative       Assessment and Plan:       Active Problems:    Vaginal bleeding in pregnancy, third trimester (2021)      Placenta previa with hemorrhage in third trimester (2021)      Antepartum hemorrhage from placenta previa (2021)       With continued minimal bleeding will continue in house monitoring with modified bedrest.

## 2021-02-16 NOTE — PROGRESS NOTES
Assessment compete. Pt denies vaginal bleeding or pain. Pt reports good FM. Pt up to shower. Will place EFM after shower.

## 2021-02-17 LAB
ERYTHROCYTE [DISTWIDTH] IN BLOOD BY AUTOMATED COUNT: 13.8 % (ref 11.9–14.6)
HCT VFR BLD AUTO: 31 % (ref 35.8–46.3)
HGB BLD-MCNC: 10.5 G/DL (ref 11.7–15.4)
MCH RBC QN AUTO: 28.5 PG (ref 26.1–32.9)
MCHC RBC AUTO-ENTMCNC: 33.9 G/DL (ref 31.4–35)
MCV RBC AUTO: 84.2 FL (ref 79.6–97.8)
NRBC # BLD: 0 K/UL (ref 0–0.2)
PLATELET # BLD AUTO: 375 K/UL (ref 150–450)
PMV BLD AUTO: 9.9 FL (ref 9.4–12.3)
RBC # BLD AUTO: 3.68 M/UL (ref 4.05–5.2)
WBC # BLD AUTO: 10.3 K/UL (ref 4.3–11.1)

## 2021-02-17 PROCEDURE — 74011250637 HC RX REV CODE- 250/637: Performed by: OBSTETRICS & GYNECOLOGY

## 2021-02-17 PROCEDURE — 65270000029 HC RM PRIVATE

## 2021-02-17 PROCEDURE — 36415 COLL VENOUS BLD VENIPUNCTURE: CPT

## 2021-02-17 PROCEDURE — 59025 FETAL NON-STRESS TEST: CPT

## 2021-02-17 PROCEDURE — BY4FZZZ ULTRASONOGRAPHY OF THIRD TRIMESTER, SINGLE FETUS: ICD-10-PCS | Performed by: OBSTETRICS & GYNECOLOGY

## 2021-02-17 PROCEDURE — 76820 UMBILICAL ARTERY ECHO: CPT | Performed by: OBSTETRICS & GYNECOLOGY

## 2021-02-17 PROCEDURE — 76816 OB US FOLLOW-UP PER FETUS: CPT | Performed by: OBSTETRICS & GYNECOLOGY

## 2021-02-17 PROCEDURE — 76819 FETAL BIOPHYS PROFIL W/O NST: CPT | Performed by: OBSTETRICS & GYNECOLOGY

## 2021-02-17 PROCEDURE — 85027 COMPLETE CBC AUTOMATED: CPT

## 2021-02-17 RX ORDER — NIFEDIPINE 10 MG/1
20 CAPSULE ORAL EVERY 6 HOURS
Status: DISCONTINUED | OUTPATIENT
Start: 2021-02-17 | End: 2021-02-21

## 2021-02-17 RX ADMIN — VITAMIN D, TAB 1000IU (100/BT) 2000 UNITS: 25 TAB at 08:34

## 2021-02-17 RX ADMIN — NIFEDIPINE 20 MG: 10 CAPSULE ORAL at 20:45

## 2021-02-17 RX ADMIN — LEVOTHYROXINE SODIUM 100 MCG: 0.1 TABLET ORAL at 08:35

## 2021-02-17 RX ADMIN — NIFEDIPINE 20 MG: 10 CAPSULE ORAL at 13:37

## 2021-02-17 RX ADMIN — NIFEDIPINE 20 MG: 10 CAPSULE ORAL at 01:24

## 2021-02-17 RX ADMIN — NIFEDIPINE 20 MG: 10 CAPSULE ORAL at 06:18

## 2021-02-17 RX ADMIN — ASPIRIN 81 MG 162 MG: 81 TABLET ORAL at 08:34

## 2021-02-17 RX ADMIN — CALCIUM CARBONATE 1000 MG: 500 TABLET, CHEWABLE ORAL at 08:35

## 2021-02-17 RX ADMIN — LORAZEPAM 1 MG: 1 TABLET ORAL at 21:31

## 2021-02-17 RX ADMIN — DOCUSATE SODIUM 100 MG: 100 CAPSULE, LIQUID FILLED ORAL at 08:35

## 2021-02-17 RX ADMIN — SERTRALINE HYDROCHLORIDE 50 MG: 100 TABLET ORAL at 20:44

## 2021-02-17 RX ADMIN — Medication 600 MG: at 20:45

## 2021-02-17 NOTE — PROGRESS NOTES
High Risk Obstetrics Progress Note    Name: Arash Silver MRN: [id number]  SSN: [id number]    YOB: 1975  Age: 39 y.o. Sex: female      Subjective:      LOS: 3 days    Estimated Date of Delivery: 21   Gestational Age Today: 29w6d     Patient admitted for vaginal bleeding. States she does not have vaginal bleeding . But has been having ctx. Objective:     Vitals:  Blood pressure 102/62, pulse 73, temperature 97.8 °F (36.6 °C), resp. rate 18, height 5' 5\" (1.651 m), weight 79.4 kg (175 lb), last menstrual period 2020, SpO2 99 %. Temp (24hrs), Av.1 °F (36.7 °C), Min:97.8 °F (36.6 °C), Max:98.5 °F (61.6 °C)    Systolic (75HQU), CDJ:592 , Min:94 , TOE:172      Diastolic (94PHM), XNX:56, Min:61, Max:64       Intake and Output:         Physical Exam:  Patient without distress. Heart: Regular rate and rhythm  Lung: clear to auscultation throughout lung fields, no wheezes, no rales, no rhonchi and normal respiratory effort  Abdomen: soft, nontender  Fundus: soft and non tender       Membranes:  Intact    Uterine Activity:  Frequency: 3 times per hour    Fetal Heart Rate:  Reactive        Labs: No results found for this or any previous visit (from the past 36 hour(s)). Assessment and Plan: Active Problems:    Vaginal bleeding in pregnancy, third trimester (2021)      Placenta previa with hemorrhage in third trimester (2021)      Antepartum hemorrhage from placenta previa (2021)       Pt did not have bleeding but had q2min ctx which resolved to irritability with IVF bolus. Will continue to monitor in house.

## 2021-02-17 NOTE — PROGRESS NOTES
02/17/21 1105   Fetal Vital Signs   Mode External   Fetal Heart Rate 136   Fetal Activity Present   Variability 6-25 BPM   Decelerations None   Accelerations Yes   RN Reviewed Strip?  Yes   Non Stress Test Reactive   Uterine Activity   Mode External   Frequency (min) 0

## 2021-02-17 NOTE — PROCEDURES
Portable Ultrasound Procedure Report    Reason for Ultrasound- vaginal bleeding, previa  Requesting MARSHALL Cordoba    Ultrasound performed at bedside for evaluation of pregnancy. Ultrasound Type: Transabdominal    Findings: Normal fetus, uterus, and placenta. Reassuring fetal status. See detailed report to follow on chart. Estimated Fetal Weight:  3#8oz  1587gm (60%), AGA    Fetal Position: Transverse  Amniotic Fluid: Normal, Amniotic Fluid Index was 23 centimeters- subjectively elevated due to fetal position. [facility]-4/7  Umbilical Artery Doppler study: Normal S/D ratio. Placenta: Marginal Previa without obvious area of hemorrhage.      Carl Lambert MD

## 2021-02-17 NOTE — PROGRESS NOTES
Patient easily awakened by calling name. Procardia given. Patient denies any complaints during the night, denies any contractions, bleeding or pain. Patient request to lay back down.

## 2021-02-17 NOTE — PROGRESS NOTES
Patient laying in bed. Asked patient if its ok to place on monitor for NST. Patient states that she needs to go to the bathroom. 2210 Back to bed toco and cardio applied. Shift assessment complete. Patient denies any bleeding, LOF or contractions. Reports good fetal movement. Stressed to patient about the importance of drinking plenty of water and keeping her bladder empty, patient verbalized understanding.

## 2021-02-18 PROCEDURE — 74011250637 HC RX REV CODE- 250/637: Performed by: OBSTETRICS & GYNECOLOGY

## 2021-02-18 PROCEDURE — 59025 FETAL NON-STRESS TEST: CPT

## 2021-02-18 PROCEDURE — 65270000029 HC RM PRIVATE

## 2021-02-18 RX ORDER — DIPHENHYDRAMINE HCL 25 MG
25 CAPSULE ORAL
Status: DISCONTINUED | OUTPATIENT
Start: 2021-02-18 | End: 2021-02-21

## 2021-02-18 RX ORDER — SERTRALINE HYDROCHLORIDE 50 MG/1
50 TABLET, FILM COATED ORAL
Status: DISCONTINUED | OUTPATIENT
Start: 2021-02-18 | End: 2021-02-21

## 2021-02-18 RX ADMIN — ASPIRIN 81 MG 162 MG: 81 TABLET ORAL at 08:42

## 2021-02-18 RX ADMIN — NIFEDIPINE 20 MG: 10 CAPSULE ORAL at 21:16

## 2021-02-18 RX ADMIN — VITAMIN D, TAB 1000IU (100/BT) 2000 UNITS: 25 TAB at 08:42

## 2021-02-18 RX ADMIN — LEVOTHYROXINE SODIUM 100 MCG: 0.1 TABLET ORAL at 08:42

## 2021-02-18 RX ADMIN — NIFEDIPINE 20 MG: 10 CAPSULE ORAL at 02:30

## 2021-02-18 RX ADMIN — CALCIUM CARBONATE 1000 MG: 500 TABLET, CHEWABLE ORAL at 08:41

## 2021-02-18 RX ADMIN — SERTRALINE HYDROCHLORIDE 50 MG: 100 TABLET ORAL at 21:16

## 2021-02-18 RX ADMIN — DIPHENHYDRAMINE HYDROCHLORIDE 25 MG: 25 CAPSULE ORAL at 21:16

## 2021-02-18 RX ADMIN — NIFEDIPINE 20 MG: 10 CAPSULE ORAL at 08:32

## 2021-02-18 RX ADMIN — DOCUSATE SODIUM 100 MG: 100 CAPSULE, LIQUID FILLED ORAL at 08:42

## 2021-02-18 RX ADMIN — NIFEDIPINE 20 MG: 10 CAPSULE ORAL at 14:06

## 2021-02-18 RX ADMIN — Medication 600 MG: at 21:15

## 2021-02-18 NOTE — PROGRESS NOTES
AM assessment complete at this time per flow sheet. Vital signs stable. Am meds given (see mar) Patient denies headache, nausea and vomiting, contractions, right upper quadrant pain, vaginal bleeding, vaginal leaking of fluid and visual disturbances. Patient states positive fetal movement. Encouraged to call as needed.

## 2021-02-18 NOTE — PROGRESS NOTES
Shift assessment complete. Patient denies any ontractions, bleeding or LOF, reports good fetal movement. Patient without complaints at this time. [place] and cardio applied to soft abdomen fro daily NST. Spouse at bedside patient denies any wants or needs at this time. Call light in reach.

## 2021-02-18 NOTE — PROGRESS NOTES
High Risk Obstetrics Progress Note    Name: Giovanni Travis MRN: [id number]  SSN: [id number]    YOB: 1975  Age: 39 y.o. Sex: female      Subjective:      LOS: 4 days    Estimated Date of Delivery: 21   Gestational Age Today: 26w0d     Patient admitted for vaginal bleeding. States she does not have vaginal bleeding . Objective:     Vitals:  Blood pressure 102/64, pulse 77, temperature 97.8 °F (36.6 °C), resp. rate 18, height 5' 5\" (1.651 m), weight 79.4 kg (175 lb), last menstrual period 2020, SpO2 99 %. Temp (24hrs), Av.7 °F (36.5 °C), Min:97.6 °F (36.4 °C), Max:97.8 °F (09.1 °C)    Systolic (01RWG), LD , Min:91 , WIO:622      Diastolic (49TGA), UMR:81, Min:59, Max:64       Intake and Output:         Physical Exam:  Patient without distress. Heart: Regular rate and rhythm  Lung: clear to auscultation throughout lung fields, no wheezes, no rales, no rhonchi and normal respiratory effort  Abdomen: soft, nontender  Fundus: soft and non tender       Membranes:  Intact    Uterine Activity:  None    Fetal Heart Rate:  Reactive        Labs:   Recent Results (from the past 36 hour(s))   CBC W/O DIFF    Collection Time: 21  8:44 AM   Result Value Ref Range    WBC 10.3 4.3 - 11.1 K/uL    RBC 3.68 (L) 4.05 - 5.2 M/uL    HGB 10.5 (L) 11.7 - 15.4 g/dL    HCT 31.0 (L) 35.8 - 46.3 %    MCV 84.2 79.6 - 97.8 FL    MCH 28.5 26.1 - 32.9 PG    MCHC 33.9 31.4 - 35.0 g/dL    RDW 13.8 11.9 - 14.6 %    PLATELET 329 831 - 582 K/uL    MPV 9.9 9.4 - 12.3 FL    ABSOLUTE NRBC 0.00 0.0 - 0.2 K/uL       Assessment and Plan: Active Problems:    Vaginal bleeding in pregnancy, third trimester (2021)      Placenta previa with hemorrhage in third trimester (2021)      Antepartum hemorrhage from placenta previa (2021)       US ysterday showed appropriate growth and no sign of hemorrhage. Will monitor in house for further changes.

## 2021-02-18 NOTE — PROGRESS NOTES
Reactive NST toco and cardio off. Patient is requesting ativan at this time. Explained to patient that the Ativan is ordered PRN for anxiety it was intended for times when she feels very anxious. Patient has been getting every night. Patient states that she did try the Ambien for sleep that she did not like that because she slept for two days. Patient states that she normally takes benadryl at home that she will try that tomorrow night but she feels that she needs the Ativan tonight that she is feeling anxious.

## 2021-02-19 PROCEDURE — 74011250637 HC RX REV CODE- 250/637: Performed by: OBSTETRICS & GYNECOLOGY

## 2021-02-19 PROCEDURE — 65270000029 HC RM PRIVATE

## 2021-02-19 RX ADMIN — NIFEDIPINE 20 MG: 10 CAPSULE ORAL at 01:46

## 2021-02-19 RX ADMIN — DOCUSATE SODIUM 100 MG: 100 CAPSULE, LIQUID FILLED ORAL at 08:59

## 2021-02-19 RX ADMIN — NIFEDIPINE 20 MG: 10 CAPSULE ORAL at 20:17

## 2021-02-19 RX ADMIN — DOCUSATE SODIUM 100 MG: 100 CAPSULE, LIQUID FILLED ORAL at 18:26

## 2021-02-19 RX ADMIN — ASPIRIN 81 MG 162 MG: 81 TABLET ORAL at 08:58

## 2021-02-19 RX ADMIN — SERTRALINE HYDROCHLORIDE 50 MG: 100 TABLET ORAL at 21:35

## 2021-02-19 RX ADMIN — NIFEDIPINE 20 MG: 10 CAPSULE ORAL at 07:50

## 2021-02-19 RX ADMIN — NIFEDIPINE 20 MG: 10 CAPSULE ORAL at 14:05

## 2021-02-19 RX ADMIN — CALCIUM CARBONATE 1000 MG: 500 TABLET, CHEWABLE ORAL at 08:58

## 2021-02-19 RX ADMIN — LEVOTHYROXINE SODIUM 100 MCG: 0.1 TABLET ORAL at 08:58

## 2021-02-19 RX ADMIN — Medication 600 MG: at 21:35

## 2021-02-19 RX ADMIN — LORAZEPAM 1 MG: 1 TABLET ORAL at 21:39

## 2021-02-19 RX ADMIN — VITAMIN D, TAB 1000IU (100/BT) 2000 UNITS: 25 TAB at 08:58

## 2021-02-19 NOTE — PROGRESS NOTES
High Risk Obstetrics Progress Note    Name: Andi Current MRN: [id number]  SSN: [id number]    YOB: 1975  Age: 39 y.o. Sex: female      Subjective:      LOS: 5 days    Estimated Date of Delivery: 21   Gestational Age Today: 30w1d     Patient admitted for vaginal bleeding. States she does not have vaginal bleeding . Objective:     Vitals:  Blood pressure 102/65, pulse 83, temperature 98.3 °F (36.8 °C), resp. rate 18, height 5' 5\" (1.651 m), weight 79.4 kg (175 lb), last menstrual period 2020, SpO2 99 %. Temp (24hrs), Av.5 °F (36.9 °C), Min:98.3 °F (36.8 °C), Max:98.6 °F (37 °C)    Systolic (36QFN), PUJ:779 , Min:102 , HBL:984      Diastolic (32CUA), RNR:40, Min:65, Max:67       Intake and Output:         Physical Exam:  Patient without distress. Heart: Regular rate and rhythm  Lung: clear to auscultation throughout lung fields, no wheezes, no rales, no rhonchi and normal respiratory effort  Abdomen: soft, nontender  Fundus: soft and non tender       Membranes:  Intact    Uterine Activity:  None    Fetal Heart Rate:  Uterine irritability: yes        Labs: No results found for this or any previous visit (from the past 36 hour(s)). Assessment and Plan: Active Problems:    Vaginal bleeding in pregnancy, third trimester (2021)      Placenta previa with hemorrhage in third trimester (2021)      Antepartum hemorrhage from placenta previa (2021)       Continues to be stable. Continue in house monitoring.

## 2021-02-19 NOTE — PROGRESS NOTES
Shift assessment complete. Patient denies any pain, contractions, LOF, headaches, reflux or blurred vision. [place] and cardio applied for daily NST. Patient denies any wants or needs at this time will call out PRN for needs. Patient went to bathroom and reports some dark brown noted on toilet paper when she wiped states very small amount none in toilet or on panties.

## 2021-02-20 LAB
ERYTHROCYTE [DISTWIDTH] IN BLOOD BY AUTOMATED COUNT: 13.5 % (ref 11.9–14.6)
HCT VFR BLD AUTO: 32.2 % (ref 35.8–46.3)
HGB BLD-MCNC: 10.9 G/DL (ref 11.7–15.4)
MCH RBC QN AUTO: 28.2 PG (ref 26.1–32.9)
MCHC RBC AUTO-ENTMCNC: 33.9 G/DL (ref 31.4–35)
MCV RBC AUTO: 83.2 FL (ref 79.6–97.8)
NRBC # BLD: 0 K/UL (ref 0–0.2)
PLATELET # BLD AUTO: 378 K/UL (ref 150–450)
PMV BLD AUTO: 9.8 FL (ref 9.4–12.3)
RBC # BLD AUTO: 3.87 M/UL (ref 4.05–5.2)
WBC # BLD AUTO: 11.7 K/UL (ref 4.3–11.1)

## 2021-02-20 PROCEDURE — 74011250637 HC RX REV CODE- 250/637: Performed by: OBSTETRICS & GYNECOLOGY

## 2021-02-20 PROCEDURE — 74011250636 HC RX REV CODE- 250/636: Performed by: OBSTETRICS & GYNECOLOGY

## 2021-02-20 PROCEDURE — 65270000029 HC RM PRIVATE

## 2021-02-20 PROCEDURE — 86923 COMPATIBILITY TEST ELECTRIC: CPT

## 2021-02-20 PROCEDURE — 86901 BLOOD TYPING SEROLOGIC RH(D): CPT

## 2021-02-20 PROCEDURE — 85027 COMPLETE CBC AUTOMATED: CPT

## 2021-02-20 RX ORDER — POLYETHYLENE GLYCOL 3350 17 G/17G
17 POWDER, FOR SOLUTION ORAL
Status: DISCONTINUED | OUTPATIENT
Start: 2021-02-20 | End: 2021-02-21 | Stop reason: HOSPADM

## 2021-02-20 RX ORDER — TERBUTALINE SULFATE 1 MG/ML
0.25 INJECTION SUBCUTANEOUS
Status: COMPLETED | OUTPATIENT
Start: 2021-02-20 | End: 2021-02-20

## 2021-02-20 RX ORDER — DEXTROSE, SODIUM CHLORIDE, SODIUM LACTATE, POTASSIUM CHLORIDE, AND CALCIUM CHLORIDE 5; .6; .31; .03; .02 G/100ML; G/100ML; G/100ML; G/100ML; G/100ML
125 INJECTION, SOLUTION INTRAVENOUS CONTINUOUS
Status: DISCONTINUED | OUTPATIENT
Start: 2021-02-20 | End: 2021-02-21

## 2021-02-20 RX ORDER — SODIUM CHLORIDE, SODIUM LACTATE, POTASSIUM CHLORIDE, CALCIUM CHLORIDE 600; 310; 30; 20 MG/100ML; MG/100ML; MG/100ML; MG/100ML
500 INJECTION, SOLUTION INTRAVENOUS
Status: DISPENSED | OUTPATIENT
Start: 2021-02-20 | End: 2021-02-20

## 2021-02-20 RX ADMIN — LORAZEPAM 1 MG: 1 TABLET ORAL at 20:47

## 2021-02-20 RX ADMIN — DOCUSATE SODIUM 100 MG: 100 CAPSULE, LIQUID FILLED ORAL at 20:46

## 2021-02-20 RX ADMIN — POLYETHYLENE GLYCOL 3350 17 G: 17 POWDER, FOR SOLUTION ORAL at 15:53

## 2021-02-20 RX ADMIN — LEVOTHYROXINE SODIUM 100 MCG: 0.1 TABLET ORAL at 08:32

## 2021-02-20 RX ADMIN — ASPIRIN 81 MG 162 MG: 81 TABLET ORAL at 09:16

## 2021-02-20 RX ADMIN — NIFEDIPINE 20 MG: 10 CAPSULE ORAL at 20:46

## 2021-02-20 RX ADMIN — VITAMIN D, TAB 1000IU (100/BT) 2000 UNITS: 25 TAB at 09:17

## 2021-02-20 RX ADMIN — NIFEDIPINE 20 MG: 10 CAPSULE ORAL at 09:16

## 2021-02-20 RX ADMIN — SERTRALINE HYDROCHLORIDE 50 MG: 100 TABLET ORAL at 21:22

## 2021-02-20 RX ADMIN — NIFEDIPINE 20 MG: 10 CAPSULE ORAL at 03:23

## 2021-02-20 RX ADMIN — ONDANSETRON 4 MG: 2 INJECTION INTRAMUSCULAR; INTRAVENOUS at 17:48

## 2021-02-20 RX ADMIN — Medication 600 MG: at 20:46

## 2021-02-20 RX ADMIN — NIFEDIPINE 20 MG: 10 CAPSULE ORAL at 15:06

## 2021-02-20 RX ADMIN — ACETAMINOPHEN 650 MG: 325 TABLET, FILM COATED ORAL at 17:48

## 2021-02-20 RX ADMIN — DOCUSATE SODIUM 100 MG: 100 CAPSULE, LIQUID FILLED ORAL at 09:16

## 2021-02-20 RX ADMIN — TERBUTALINE SULFATE 0.25 MG: 1 INJECTION, SOLUTION SUBCUTANEOUS at 06:26

## 2021-02-20 RX ADMIN — CALCIUM CARBONATE 400 MG: 500 TABLET, CHEWABLE ORAL at 09:16

## 2021-02-20 NOTE — PROGRESS NOTES
Dr Cordoba here.  Assessment of pt done per MD.  Discussed POC.  No new orders at this time.  Pt still having spotting when wiping post void.

## 2021-02-20 NOTE — PROGRESS NOTES
Phone call to Dr. Lonnie John to inform of patients contractions every 5-10 minutes. Per MD to give patient a dose of Terbutaline 0.25 mg NOW and may repeat dose if no better after 30 minutes.

## 2021-02-20 NOTE — PROGRESS NOTES
High Risk Obstetrics Progress Note    Name: Dorothe Ahumada MRN: [id number]  SSN: [id number]    YOB: 1975  Age: 39 y.o. Sex: female      Subjective:      LOS: 6 days    Estimated Date of Delivery: 21   Gestational Age Today: 27w4d     Patient admitted for vaginal bleeding. States she does have mild vaginal bleeding. Objective:     Vitals:  Blood pressure 102/69, pulse 87, temperature 98.4 °F (36.9 °C), resp. rate 16, height 5' 5\" (1.651 m), weight 79.4 kg (175 lb), last menstrual period 2020, SpO2 99 %. Temp (24hrs), Av.2 °F (36.8 °C), Min:97.9 °F (36.6 °C), Max:98.4 °F (54.8 °C)    Systolic (61TYJ), JHD:457 , Min:102 , HPE:657      Diastolic (78LOM), YET:04, Min:63, Max:72       Intake and Output:         Physical Exam:  Patient without distress. Heart: Regular rate and rhythm  Lung: clear to auscultation throughout lung fields, no wheezes, no rales, no rhonchi and normal respiratory effort  Abdomen: soft, nontender  Fundus: soft and non tender       Membranes:  Intact    Uterine Activity:  None    Fetal Heart Rate:  Reactive        Labs:   Recent Results (from the past 36 hour(s))   TYPE & SCREEN    Collection Time: 21  3:43 AM   Result Value Ref Range    Crossmatch Expiration 2021,2359     ABO/Rh(D) A POSITIVE     Antibody screen NEG    CBC W/O DIFF    Collection Time: 21  3:45 AM   Result Value Ref Range    WBC 11.7 (H) 4.3 - 11.1 K/uL    RBC 3.87 (L) 4.05 - 5.2 M/uL    HGB 10.9 (L) 11.7 - 15.4 g/dL    HCT 32.2 (L) 35.8 - 46.3 %    MCV 83.2 79.6 - 97.8 FL    MCH 28.2 26.1 - 32.9 PG    MCHC 33.9 31.4 - 35.0 g/dL    RDW 13.5 11.9 - 14.6 %    PLATELET 670 746 - 213 K/uL    MPV 9.8 9.4 - 12.3 FL    ABSOLUTE NRBC 0.00 0.0 - 0.2 K/uL       Assessment and Plan:       Active Problems:    Vaginal bleeding in pregnancy, third trimester (2021)      Placenta previa with hemorrhage in third trimester (2021)      Antepartum hemorrhage from placenta previa (2/14/2021)       Pt had bleeding again last pm with q 5min ctx. IVF restarted and one dose of terbutaline stopped the ctx. Pt doing well this am with only a smear of BRB on toilet paper. Will con. In house monitoring.

## 2021-02-20 NOTE — PROGRESS NOTES
0315-In patients room in response to emergency call bell. Patient in bathroom on toilet crying stating \" I am bleeding, I am bleeding a lot\" Patient requested to be taken to be placed onto EFM. Patient to bed and placed onto EFM, fetal heart tones present and audible at 140 BPM.  Notable visible blood in toilet and call placed to on call hospitalist to come assess patient and bleeding. Upon assessment in room OB hospitalist states bleeding seems to have stopped. IV status reviewed and current type and screen status discussed. To give pt Procardia now and get current type and screen. Procardia given PO as per order NOW and type and screen drawn. Advised to call Dr Cora Chilel for further orders. 0330 Phone call placed to Dr. Cora Chilel regarding patients bleeding. Advised Dr. Abigail Velez called to room to assess bleeding, informed bleeding seemed to stop at this time. Per Dr. Cora Chilel to start 500 cc bolus and continue with fluids at 125ml/hr. To call with any changes or increase in symptoms.

## 2021-02-20 NOTE — PROGRESS NOTES
FHR reactive. VSS. Describes scant bleeding pink/ bright red when wiping. No clots or pad changes. Denies pian or UC. IV infusing. Waiting for breakfast.  Denies needs.

## 2021-02-20 NOTE — PROGRESS NOTES
Last BM yesterday. States she felt like she strained some. On colace BID and Magnesium at bedtime. Order received for Miralax daily PRN. Will give a dose today. Declines shower today at this time.

## 2021-02-20 NOTE — PROGRESS NOTES
Pt called out with complaints of nausea and HA. HA pain rated 3-4/10. Tylenol and zofran given see MAR for details.

## 2021-02-20 NOTE — PROGRESS NOTES
Pt in bed with  at bedside. Shaky and feeling anxious post terbutaline. States she is ok and breathing through the anxiety. Denies needs at this time.

## 2021-02-21 ENCOUNTER — ANESTHESIA (OUTPATIENT)
Dept: LABOR AND DELIVERY | Age: 46
DRG: 771 | End: 2021-02-21
Payer: OTHER GOVERNMENT

## 2021-02-21 ENCOUNTER — ANESTHESIA EVENT (OUTPATIENT)
Dept: LABOR AND DELIVERY | Age: 46
DRG: 771 | End: 2021-02-21
Payer: OTHER GOVERNMENT

## 2021-02-21 VITALS
WEIGHT: 175 LBS | DIASTOLIC BLOOD PRESSURE: 74 MMHG | BODY MASS INDEX: 29.16 KG/M2 | SYSTOLIC BLOOD PRESSURE: 116 MMHG | HEART RATE: 88 BPM | HEIGHT: 65 IN | OXYGEN SATURATION: 100 % | RESPIRATION RATE: 21 BRPM | TEMPERATURE: 98.3 F

## 2021-02-21 LAB
ERYTHROCYTE [DISTWIDTH] IN BLOOD BY AUTOMATED COUNT: 13.5 % (ref 11.9–14.6)
HCT VFR BLD AUTO: 31.9 % (ref 35.8–46.3)
HGB BLD-MCNC: 10.5 G/DL (ref 11.7–15.4)
MCH RBC QN AUTO: 28.2 PG (ref 26.1–32.9)
MCHC RBC AUTO-ENTMCNC: 32.9 G/DL (ref 31.4–35)
MCV RBC AUTO: 85.5 FL (ref 79.6–97.8)
NRBC # BLD: 0 K/UL (ref 0–0.2)
PLATELET # BLD AUTO: 438 K/UL (ref 150–450)
PMV BLD AUTO: 9.7 FL (ref 9.4–12.3)
RBC # BLD AUTO: 3.73 M/UL (ref 4.05–5.2)
WBC # BLD AUTO: 22 K/UL (ref 4.3–11.1)

## 2021-02-21 PROCEDURE — 77030032490 HC SLV COMPR SCD KNE COVD -B: Performed by: OBSTETRICS & GYNECOLOGY

## 2021-02-21 PROCEDURE — 2709999900 HC NON-CHARGEABLE SUPPLY: Performed by: OBSTETRICS & GYNECOLOGY

## 2021-02-21 PROCEDURE — 74011250636 HC RX REV CODE- 250/636: Performed by: NURSE ANESTHETIST, CERTIFIED REGISTERED

## 2021-02-21 PROCEDURE — 74011250636 HC RX REV CODE- 250/636: Performed by: OBSTETRICS & GYNECOLOGY

## 2021-02-21 PROCEDURE — 85027 COMPLETE CBC AUTOMATED: CPT

## 2021-02-21 PROCEDURE — 88307 TISSUE EXAM BY PATHOLOGIST: CPT

## 2021-02-21 PROCEDURE — 76010000392 HC C SECN EA ADDL 0.5 HR: Performed by: OBSTETRICS & GYNECOLOGY

## 2021-02-21 PROCEDURE — 74011000250 HC RX REV CODE- 250: Performed by: NURSE ANESTHETIST, CERTIFIED REGISTERED

## 2021-02-21 PROCEDURE — 74011250637 HC RX REV CODE- 250/637: Performed by: OBSTETRICS & GYNECOLOGY

## 2021-02-21 PROCEDURE — 77030007880 HC KT SPN EPDRL BBMI -B: Performed by: ANESTHESIOLOGY

## 2021-02-21 PROCEDURE — 74011000250 HC RX REV CODE- 250: Performed by: ANESTHESIOLOGY

## 2021-02-21 PROCEDURE — 77030011943: Performed by: OBSTETRICS & GYNECOLOGY

## 2021-02-21 PROCEDURE — 76060000078 HC EPIDURAL ANESTHESIA: Performed by: OBSTETRICS & GYNECOLOGY

## 2021-02-21 PROCEDURE — 74011250636 HC RX REV CODE- 250/636: Performed by: ANESTHESIOLOGY

## 2021-02-21 PROCEDURE — 77030031139 HC SUT VCRL2 J&J -A: Performed by: OBSTETRICS & GYNECOLOGY

## 2021-02-21 PROCEDURE — 76010000391 HC C SECN FIRST 1 HR: Performed by: OBSTETRICS & GYNECOLOGY

## 2021-02-21 PROCEDURE — 77030003665 HC NDL SPN BBMI -A: Performed by: ANESTHESIOLOGY

## 2021-02-21 PROCEDURE — 75410000003 HC RECOV DEL/VAG/CSECN EA 0.5 HR: Performed by: OBSTETRICS & GYNECOLOGY

## 2021-02-21 PROCEDURE — 77030002974 HC SUT PLN J&J -A: Performed by: OBSTETRICS & GYNECOLOGY

## 2021-02-21 RX ORDER — MORPHINE SULFATE 0.5 MG/ML
INJECTION, SOLUTION EPIDURAL; INTRATHECAL; INTRAVENOUS AS NEEDED
Status: DISCONTINUED | OUTPATIENT
Start: 2021-02-21 | End: 2021-02-21 | Stop reason: HOSPADM

## 2021-02-21 RX ORDER — BUPIVACAINE HYDROCHLORIDE 7.5 MG/ML
INJECTION, SOLUTION INTRASPINAL AS NEEDED
Status: DISCONTINUED | OUTPATIENT
Start: 2021-02-21 | End: 2021-02-21 | Stop reason: HOSPADM

## 2021-02-21 RX ORDER — ACETAMINOPHEN 500 MG
1000 TABLET ORAL
Status: DISCONTINUED | OUTPATIENT
Start: 2021-02-21 | End: 2021-02-21 | Stop reason: HOSPADM

## 2021-02-21 RX ORDER — HYDROMORPHONE HYDROCHLORIDE 1 MG/ML
1 INJECTION, SOLUTION INTRAMUSCULAR; INTRAVENOUS; SUBCUTANEOUS
Status: DISCONTINUED | OUTPATIENT
Start: 2021-02-21 | End: 2021-02-21 | Stop reason: HOSPADM

## 2021-02-21 RX ORDER — DOCUSATE SODIUM 100 MG/1
100 CAPSULE, LIQUID FILLED ORAL 2 TIMES DAILY
Status: CANCELLED | OUTPATIENT
Start: 2021-02-21

## 2021-02-21 RX ORDER — NALOXONE HYDROCHLORIDE 0.4 MG/ML
0.4 INJECTION, SOLUTION INTRAMUSCULAR; INTRAVENOUS; SUBCUTANEOUS AS NEEDED
Status: CANCELLED | OUTPATIENT
Start: 2021-02-21

## 2021-02-21 RX ORDER — EPHEDRINE SULFATE/0.9% NACL/PF 50 MG/5 ML
SYRINGE (ML) INTRAVENOUS AS NEEDED
Status: DISCONTINUED | OUTPATIENT
Start: 2021-02-21 | End: 2021-02-21 | Stop reason: HOSPADM

## 2021-02-21 RX ORDER — ACETAMINOPHEN 500 MG
1000 TABLET ORAL EVERY 6 HOURS
Status: DISCONTINUED | OUTPATIENT
Start: 2021-02-21 | End: 2021-02-21 | Stop reason: HOSPADM

## 2021-02-21 RX ORDER — SODIUM CHLORIDE, SODIUM LACTATE, POTASSIUM CHLORIDE, CALCIUM CHLORIDE 600; 310; 30; 20 MG/100ML; MG/100ML; MG/100ML; MG/100ML
125 INJECTION, SOLUTION INTRAVENOUS CONTINUOUS
Status: DISCONTINUED | OUTPATIENT
Start: 2021-02-21 | End: 2021-02-21 | Stop reason: HOSPADM

## 2021-02-21 RX ORDER — SODIUM CHLORIDE, SODIUM LACTATE, POTASSIUM CHLORIDE, CALCIUM CHLORIDE 600; 310; 30; 20 MG/100ML; MG/100ML; MG/100ML; MG/100ML
INJECTION, SOLUTION INTRAVENOUS
Status: DISCONTINUED | OUTPATIENT
Start: 2021-02-21 | End: 2021-02-21 | Stop reason: HOSPADM

## 2021-02-21 RX ORDER — ONDANSETRON 4 MG/1
4 TABLET, ORALLY DISINTEGRATING ORAL
Status: CANCELLED | OUTPATIENT
Start: 2021-02-21

## 2021-02-21 RX ORDER — SODIUM CHLORIDE 0.9 % (FLUSH) 0.9 %
5-40 SYRINGE (ML) INJECTION EVERY 8 HOURS
Status: DISCONTINUED | OUTPATIENT
Start: 2021-02-21 | End: 2021-02-21 | Stop reason: HOSPADM

## 2021-02-21 RX ORDER — OXYCODONE HYDROCHLORIDE 5 MG/1
10 TABLET ORAL
Status: DISCONTINUED | OUTPATIENT
Start: 2021-02-21 | End: 2021-02-21 | Stop reason: HOSPADM

## 2021-02-21 RX ORDER — SODIUM CHLORIDE, SODIUM LACTATE, POTASSIUM CHLORIDE, CALCIUM CHLORIDE 600; 310; 30; 20 MG/100ML; MG/100ML; MG/100ML; MG/100ML
100 INJECTION, SOLUTION INTRAVENOUS CONTINUOUS
Status: DISCONTINUED | OUTPATIENT
Start: 2021-02-21 | End: 2021-02-21

## 2021-02-21 RX ORDER — HYDROMORPHONE HYDROCHLORIDE 1 MG/ML
1 INJECTION, SOLUTION INTRAMUSCULAR; INTRAVENOUS; SUBCUTANEOUS
Status: CANCELLED | OUTPATIENT
Start: 2021-02-21

## 2021-02-21 RX ORDER — NALBUPHINE HYDROCHLORIDE 10 MG/ML
5 INJECTION, SOLUTION INTRAMUSCULAR; INTRAVENOUS; SUBCUTANEOUS
Status: DISCONTINUED | OUTPATIENT
Start: 2021-02-21 | End: 2021-02-21 | Stop reason: HOSPADM

## 2021-02-21 RX ORDER — SIMETHICONE 80 MG
80 TABLET,CHEWABLE ORAL
Status: CANCELLED | OUTPATIENT
Start: 2021-02-21

## 2021-02-21 RX ORDER — DIPHENHYDRAMINE HCL 25 MG
25 CAPSULE ORAL
Status: CANCELLED | OUTPATIENT
Start: 2021-02-21

## 2021-02-21 RX ORDER — IBUPROFEN 800 MG/1
800 TABLET ORAL EVERY 6 HOURS
Status: CANCELLED | OUTPATIENT
Start: 2021-02-21

## 2021-02-21 RX ORDER — NALOXONE HYDROCHLORIDE 0.4 MG/ML
0.2 INJECTION, SOLUTION INTRAMUSCULAR; INTRAVENOUS; SUBCUTANEOUS
Status: DISCONTINUED | OUTPATIENT
Start: 2021-02-21 | End: 2021-02-21 | Stop reason: HOSPADM

## 2021-02-21 RX ORDER — OXYCODONE HYDROCHLORIDE 5 MG/1
5 TABLET ORAL
Status: CANCELLED | OUTPATIENT
Start: 2021-02-21

## 2021-02-21 RX ORDER — ONDANSETRON 2 MG/ML
INJECTION INTRAMUSCULAR; INTRAVENOUS AS NEEDED
Status: DISCONTINUED | OUTPATIENT
Start: 2021-02-21 | End: 2021-02-21 | Stop reason: HOSPADM

## 2021-02-21 RX ORDER — MISOPROSTOL 200 UG/1
1000 TABLET ORAL ONCE
Status: COMPLETED | OUTPATIENT
Start: 2021-02-21 | End: 2021-02-21

## 2021-02-21 RX ORDER — TRISODIUM CITRATE DIHYDRATE AND CITRIC ACID MONOHYDRATE 500; 334 MG/5ML; MG/5ML
30 SOLUTION ORAL ONCE
Status: DISCONTINUED | OUTPATIENT
Start: 2021-02-21 | End: 2021-02-21

## 2021-02-21 RX ORDER — METHYLERGONOVINE MALEATE 0.2 MG/ML
INJECTION INTRAVENOUS AS NEEDED
Status: DISCONTINUED | OUTPATIENT
Start: 2021-02-21 | End: 2021-02-21 | Stop reason: HOSPADM

## 2021-02-21 RX ORDER — CEFAZOLIN SODIUM/WATER 2 G/20 ML
SYRINGE (ML) INTRAVENOUS AS NEEDED
Status: DISCONTINUED | OUTPATIENT
Start: 2021-02-21 | End: 2021-02-21 | Stop reason: HOSPADM

## 2021-02-21 RX ORDER — SERTRALINE HYDROCHLORIDE 50 MG/1
100 TABLET, FILM COATED ORAL
Status: DISCONTINUED | OUTPATIENT
Start: 2021-02-21 | End: 2021-02-21 | Stop reason: HOSPADM

## 2021-02-21 RX ORDER — MISOPROSTOL 200 UG/1
TABLET ORAL
Status: DISCONTINUED
Start: 2021-02-21 | End: 2021-02-21

## 2021-02-21 RX ORDER — LEVOTHYROXINE SODIUM 112 UG/1
112 TABLET ORAL
Status: DISCONTINUED | OUTPATIENT
Start: 2021-02-22 | End: 2021-02-21 | Stop reason: HOSPADM

## 2021-02-21 RX ORDER — SODIUM CHLORIDE 0.9 % (FLUSH) 0.9 %
5-40 SYRINGE (ML) INJECTION AS NEEDED
Status: DISCONTINUED | OUTPATIENT
Start: 2021-02-21 | End: 2021-02-21

## 2021-02-21 RX ORDER — OXYTOCIN/RINGER'S LACTATE 30/500 ML
PLASTIC BAG, INJECTION (ML) INTRAVENOUS
Status: DISCONTINUED | OUTPATIENT
Start: 2021-02-21 | End: 2021-02-21 | Stop reason: HOSPADM

## 2021-02-21 RX ADMIN — Medication 10 ML: at 12:05

## 2021-02-21 RX ADMIN — PHENYLEPHRINE HYDROCHLORIDE 100 MCG: 10 INJECTION INTRAVENOUS at 06:40

## 2021-02-21 RX ADMIN — PHENYLEPHRINE HYDROCHLORIDE 100 MCG: 10 INJECTION INTRAVENOUS at 06:31

## 2021-02-21 RX ADMIN — Medication 10 MG: at 06:36

## 2021-02-21 RX ADMIN — PHENYLEPHRINE HYDROCHLORIDE 100 MCG: 10 INJECTION INTRAVENOUS at 06:19

## 2021-02-21 RX ADMIN — HYDROMORPHONE HYDROCHLORIDE 1 MG: 1 INJECTION, SOLUTION INTRAMUSCULAR; INTRAVENOUS; SUBCUTANEOUS at 15:27

## 2021-02-21 RX ADMIN — SODIUM CHLORIDE, SODIUM LACTATE, POTASSIUM CHLORIDE, AND CALCIUM CHLORIDE: 600; 310; 30; 20 INJECTION, SOLUTION INTRAVENOUS at 06:14

## 2021-02-21 RX ADMIN — MORPHINE SULFATE 150 MG: 0.5 INJECTION, SOLUTION EPIDURAL; INTRATHECAL; INTRAVENOUS at 06:18

## 2021-02-21 RX ADMIN — MISOPROSTOL 1000 MCG: 200 TABLET ORAL at 07:13

## 2021-02-21 RX ADMIN — PHENYLEPHRINE HYDROCHLORIDE 100 MCG: 10 INJECTION INTRAVENOUS at 06:22

## 2021-02-21 RX ADMIN — Medication 10 MG: at 07:13

## 2021-02-21 RX ADMIN — PHENYLEPHRINE HYDROCHLORIDE 100 MCG: 10 INJECTION INTRAVENOUS at 06:25

## 2021-02-21 RX ADMIN — CEFAZOLIN 2 G: 1 INJECTION, POWDER, FOR SOLUTION INTRAVENOUS at 06:20

## 2021-02-21 RX ADMIN — NIFEDIPINE 20 MG: 10 CAPSULE ORAL at 02:45

## 2021-02-21 RX ADMIN — PHENYLEPHRINE HYDROCHLORIDE 100 MCG: 10 INJECTION INTRAVENOUS at 06:37

## 2021-02-21 RX ADMIN — BUPIVACAINE HYDROCHLORIDE IN DEXTROSE 1.6 ML: 7.5 INJECTION, SOLUTION SUBARACHNOID at 06:18

## 2021-02-21 RX ADMIN — PHENYLEPHRINE HYDROCHLORIDE 100 MCG: 10 INJECTION INTRAVENOUS at 06:46

## 2021-02-21 RX ADMIN — SODIUM CHLORIDE, SODIUM LACTATE, POTASSIUM CHLORIDE, AND CALCIUM CHLORIDE 100 ML/HR: 600; 310; 30; 20 INJECTION, SOLUTION INTRAVENOUS at 08:33

## 2021-02-21 RX ADMIN — MORPHINE SULFATE 4.85 MG: 0.5 INJECTION, SOLUTION EPIDURAL; INTRATHECAL; INTRAVENOUS at 06:37

## 2021-02-21 RX ADMIN — METHYLERGONOVINE MALEATE 0.2 MG: 0.2 INJECTION, SOLUTION INTRAMUSCULAR; INTRAVENOUS at 06:42

## 2021-02-21 RX ADMIN — SODIUM CHLORIDE, SODIUM LACTATE, POTASSIUM CHLORIDE, AND CALCIUM CHLORIDE 125 ML/HR: 600; 310; 30; 20 INJECTION, SOLUTION INTRAVENOUS at 12:04

## 2021-02-21 RX ADMIN — PHENYLEPHRINE HYDROCHLORIDE 100 MCG: 10 INJECTION INTRAVENOUS at 06:44

## 2021-02-21 RX ADMIN — OXYTOCIN 500 ML/HR: 10 INJECTION, SOLUTION INTRAMUSCULAR; INTRAVENOUS at 06:34

## 2021-02-21 RX ADMIN — SODIUM CHLORIDE, SODIUM LACTATE, POTASSIUM CHLORIDE, AND CALCIUM CHLORIDE: 600; 310; 30; 20 INJECTION, SOLUTION INTRAVENOUS at 06:49

## 2021-02-21 RX ADMIN — PROMETHAZINE HYDROCHLORIDE 12.5 MG: 25 INJECTION INTRAMUSCULAR; INTRAVENOUS at 12:03

## 2021-02-21 RX ADMIN — ONDANSETRON 4 MG: 2 INJECTION INTRAMUSCULAR; INTRAVENOUS at 06:29

## 2021-02-21 NOTE — PROGRESS NOTES
CTSP due to vaginal bleeding. Upon entry into room, RN had pad filled with blood and clot. The pt was afeb, VSS, conversing appropriately. FHR 120s, +accels, no decels, mod variability, category 1  SSE: A speculum was inserted vaginally and filled with clot. The speculum was removed, cleaned and replaced. The speculum again filled with blood and clot. 40 yo  at 30+3 wks with bleeding previa and FHTs category 1. A PLTCS was advised. R/B/A were reviewed. As the FHR was category 1 and the pt's vitals were stable, a complete OR count and spinal anesthetic were recommended assuming reassuring FHTs in the OR.    Ruddy Toledo MD

## 2021-02-21 NOTE — PROGRESS NOTES
In room to give patient scheduled dose of Procardia. Patient sleeping upon entering room. Procardia given PO as per order and vital signs obtained, VSS. Patient denies any further needs at this time. Patient remains resting in bed with call bell within reach.

## 2021-02-21 NOTE — PROGRESS NOTES
Nutrition Note (Disaster Mode)-LOS      Recommendations/Plan: Await diet to be resumed post-op     Assessment: Nutrition Assessment: S/P c section this AM. Currently no active diet order.   Prior diet order was regular with 1 recorded meal intake in past 7 days of 100%    Reason for Visit: Initial, RD nutrition re-screen/LOS    No diet orders on file    Height: 5' 5\" (165.1 cm)    Weight: 79.4 kg (175 lb)    Diagnosis: No nutrition diagnosis at this time.    Monitoring and Evaluation: Patient will be monitored per nutrition standards of care. Consult Dietitian if nutrition intervention essential to patient care is needed.     Discharge Planning: No needs    Chantell Little RD on 2/21/2021 at 10:03 AM    Contact: [phone]

## 2021-02-21 NOTE — PROGRESS NOTES
Arrived to OR at 0614 \[facility] [place] [facility]\"" in the 140s before and after spinal.

## 2021-02-21 NOTE — PROGRESS NOTES
TRANSFER - OUT REPORT:    Verbal report given to Cipriano Schultz RN on April Wade  being transferred to [facility] room 6326(unit) for routine progression of care       Report consisted of patients Situation, Background, Assessment and   Recommendations(SBAR). Information from the following report(s) SBAR, Kardex, Procedure Summary, Intake/Output and MAR was reviewed with the receiving nurse. Lines:   Peripheral IV 02/16/21 Left;Posterior Forearm (Active)   Site Assessment Clean, dry, & intact 02/21/21 1130   Phlebitis Assessment 0 02/21/21 1130   Infiltration Assessment 0 02/21/21 1130   Dressing Status Clean, dry, & intact 02/21/21 1130   Dressing Type Transparent 02/21/21 1130   Hub Color/Line Status Capped;Flushed 02/21/21 1130        Opportunity for questions and clarification was provided.       Patient transported with:   Belongings via [employer]

## 2021-02-21 NOTE — ADDENDUM NOTE
Addendum  created 02/21/21 1313 by Gisele Almonte CRNA    Flowsheet accepted, Flowsheet data copied forward, Intraprocedure Flowsheets edited

## 2021-02-21 NOTE — PROGRESS NOTES
0700 Bedside and Verbal shift change report given to TIFFANY Ibanez RN (oncoming nurse) by KIMBERLEY Hernandez RN (offgoing nurse). Report included the following information SBAR.   0707 Incision close  0727 Pt. Transported to MIU via bed with TIFFANY Ibanez RN and Rachel SALGADO in stable condtion.  0820 Dr. Cordoba at bedside. Fundus and vaginal bleeding assessed per Dr. Cordoba. No new orders received.  0930 TRANSFER - OUT REPORT:    Verbal report given to A Yovani RN(name) on Iza Gutierrez  being transferred to MIU(unit) for routine progression of care       Report consisted of patient’s Situation, Background, Assessment and   Recommendations(SBAR).     Information from the following report(s) SBAR was reviewed with the receiving nurse.    Lines:   Peripheral IV 02/16/21 Left;Posterior Forearm (Active)   Site Assessment Clean, dry, & intact 02/20/21 1500   Phlebitis Assessment 0 02/20/21 1500   Infiltration Assessment 0 02/20/21 1500   Dressing Status Clean, dry, & intact 02/20/21 1500   Dressing Type Tape;Transparent 02/20/21 0730   Hub Color/Line Status Capped 02/20/21 1500        Opportunity for questions and clarification was provided.      Patient transported with:   Registered Nurse

## 2021-02-21 NOTE — PROGRESS NOTES
In room to give patient scheduled medications. Patient resting in bed watching TV with  in room at bedside. Patient denies any further needs at this time. Call bell within reach of patient.

## 2021-02-21 NOTE — PROGRESS NOTES
SBAR IN Report: Mother    Verbal report received from Vita Dejesus RN on this patient, who is now being transferred from OR/PACU (unit) for routine progression of care. The patient is wearing a green \"Anesthesia-Duramorph\" band. Report consisted of patient's Situation, Background, Assessment and Recommendations (SBAR). [place] ID bands were compared with the identification form, and verified with the patient and transferring nurse. Information from the Procedure Summary and the [place] Report was reviewed with the transferring nurse; opportunity for questions and clarification provided.     Infant in SCN

## 2021-02-21 NOTE — L&D DELIVERY NOTE
LOW TRANSVERSE  SECTION   FULL OP NOTE    PATIENT: Katerin Campos  MRN: [id number]      PREOPERATIVE DIAGNOSIS: Placenta previa with vaginal bleeding, emergency    POSTOPERATIVE DIAGNOSIS: Same    PROCEDURE:  Low transverse  section    SURGEON: Bret Jarrett MD    ASSISTANT:Dr. Martín Cavazos    ANESTHESIA: Spinal    ESTIMATED BLOOD LOSS: see QBL    SPECIMENS: placenta    PROCEDURE IN DETAIL:   Due to an emergency, Dr. Janet Stroud started the section as I was making my way to hospital.  The patient was taken to the operating room where spinal anesthesia was found to be adequate. Time out was done to confirm the operating procedure, surgeon, patient and site. Once confirmed by the team, procedure was started. The patient was prepped and draped in the normal sterile fashion. A Pfannenstiel skin incision was made with a scalpel and carried down to the underlying fascia. The fascial incision was extended laterally with Davey scissors. The superior edge of the fascial incision was grasped with Kocher clamps, tented up, and the underlying rectus muscle dissected off bluntly. The inferior edge of the rectus fascia was grasped with Kocher clamps, tented up, and the underlying rectus muscle dissected off bluntly. The rectus muscles were divided in the midline bluntly. The peritoneum was entered bluntly and extended superiorly and inferiorly with the Metzenbaum scissors. A bladder blade was then inserted. The vesicouterine peritoneum was identified, grasped with pickups and entered sharply with the Metzenbaum scissors and extended laterally. A low transverse uterine incision was made with the scalpel and extended laterally with blunt finger dissection. The babys head was then delivered atraumatically. The nose and mouth were suctioned. The cord was clamped and cut. The baby was handed off to the awaiting  Intensive Care Unit staff. The placenta was then delivered manually.  Dr. Janet Stroud noted that the placenta was very adherent and had to be forcibly peeled off the uterine right wall. The uterus was exteriorized and the uterus was cleared of all clots and debris. The uterine incision was closed in two layers; the first layer was a running locked layer of 0 Vicryl and the second layer was an imbricating layer of 0 Vicryl. I took over on the second layer of the uterus. Good hemostasis was assured. Methergine IM had been given prior to my taking over. Paracolic gutters were washed with warm normal saline. The uterus was returned to the abdomen. A piece of Interceed was placed over the uterine incision and the peritoneum was then closed with 2-0 Vicryl in a running fashion. The fascia was closed with 0-Vicryl in a running fashion. Good hemostasis was assured throughout. The subcuticular layers were reapproximated with 2-0 plain gut in an interrupted fashion. The skin was closed with 4-0 Vicryl in a subcuticular fashion. The patient tolerated the procedure well. 1000mg of cytotec was placed rectally. Sponge, lap, and needle counts were correct times two. The patient was taken to the recovery in stable condition. Delivery Summary    Patient: Gosia Gloria MRN: [id number]  SSN: [id number]    YOB: 1975  Age: 39 y.o. Sex: female        Information for the patient's :  Jamila العلي [[id number]]       Labor Events:    Labor: Yes    Steroids:     Cervical Ripening Date/Time:       Cervical Ripening Type:     Antibiotics During Labor: No   Rupture Identifier: Sac 1    Rupture Date/Time: 2021 6:32 AM   Rupture Type: AROM   Amniotic Fluid Volume: Large    Amniotic Fluid Description: Clear    Amniotic Fluid Odor: None    Induction:         Induction Date/Time:        Indications for Induction:      Augmentation:     Augmentation Date/Time:      Indications for Augmentation:     Labor complications:          Additional complications:        Delivery Events:  Indications For Episiotomy: Episiotomy:     Perineal Laceration(s):     Repaired:     Periurethral Laceration Location:      Repaired:     Labial Laceration Location:     Repaired:     Sulcal Laceration Location:     Repaired:     Vaginal Laceration Location:     Repaired:     Cervical Laceration Location:     Repaired:     Repair Suture:     Number of Repair Packets:     Estimated Blood Loss (ml):  ml   Quantitaive Blood Loss (ml):             Delivery Date: 2021    Delivery Time: 6:33 AM   Delivery Type: , Low Transverse     Details    Trial of Labor: No   Primary/Repeat: Primary   Priority: Emergency   Indications: Other (Add Comments) Placenta previa     Sex:  Female     Gestational Age: 32w2d  Delivery Clinician:  Dov Arriaga  Living Status: Living   Delivery Location: L&D  OR            APGARS  One minute Five minutes Ten minutes   Skin color:            Heart rate:            Grimace:            Muscle tone:            Breathing: Totals:              Presentation: Vertex    Position: Left Occiput Anterior  Resuscitation Method:  Suctioning-bulb;PPV; Tactile Stimulation     Meconium Stained: None      Cord Information: 3 Vessels  Complications: None  Cord around:    Delayed cord clamping? Yes  Cord clamped date/time:2021  6:33 AM  Disposition of Cord Blood: Lab    Blood Gases Sent?: No    Placenta:  Date/Time: 2021  6:34 AM  Removal: Manual Removal      Appearance:       [place] Measurements:  Birth Weight: 1.715 kg      Birth Length: 43 cm      Head Circumference: 29.5 cm      Chest Circumference: 27 cm     Abdominal Girth:       Other Providers:   BOBBY BROWN;JOSE RAMON BISWAS;SERJIO GRAJEDA;MARYLOU LEON;VIOLET MORRIS;DAISY REYNOLDS W;VERONICA MOORE;RITA HARRIS, Obstetrician;Primary Nurse;Primary [place] Nurse;Neonatologist;Anesthesiologist;Crna;Surgeon Assistant;Scrub Tech             Group B Strep: No results found for: GRBSEXT, GRBSEXT  Information for the patient's :  Rell Lopes [[id number]]   No results found for: ABORH, PCTABR, PCTDIG, BILI, ABORHEXT, ABORH     No results for input(s): PCO2CB, PO2CB, HCO3I, SO2I, IBD, PTEMPI, SPECTI, PHICB, ISITE, IDEV, IALLEN in the last 72 hours.

## 2021-02-21 NOTE — ANESTHESIA POSTPROCEDURE EVALUATION
Procedure(s):   SECTION. spinal    Anesthesia Post Evaluation      Multimodal analgesia: multimodal analgesia used between 6 hours prior to anesthesia start to PACU discharge  Patient location during evaluation: floor  Patient participation: complete - patient participated  Level of consciousness: awake and alert  Pain management: adequate  Airway patency: patent  Anesthetic complications: no  Cardiovascular status: acceptable  Respiratory status: acceptable  Hydration status: acceptable  Comments: Motor/sensory function has returned to lower extremities bilaterally. Patient appears satisfied with neuraxial anesthesia for C section. Will follow.   Post anesthesia nausea and vomiting:  none  Final Post Anesthesia Temperature Assessment:  Normothermia (36.0-37.5 degrees C)      INITIAL Post-op Vital signs:   Vitals Value Taken Time   /61 21 0727   Temp 36.4 °C (97.5 °F) 21 0727   Pulse     Resp     SpO2

## 2021-02-21 NOTE — PROGRESS NOTES
Dr. Shreya Huston arranged patient transfer to [facility], Dr. Clarisa Russell to accept. UOP just adequate.  bolus and leave bermeo in for transfer.

## 2021-02-21 NOTE — LACTATION NOTE
In to see mom before being transferred to [facility] to be w/ her premature baby. RN got her started pumping recently. She got 2mls. Reviewed benefits of breast milk and encouraged her to keep pumping q 3 to help bring her milk in well. Reviewed normal pump volumes for first week of life. Gathered her pump parts for her to bring to hospital as they have some kind of pump there. Answered mom's questions. No further needs at this time.

## 2021-02-21 NOTE — ANESTHESIA PROCEDURE NOTES
Spinal Block    Start time: 2/21/2021 6:17 AM  End time: 2/21/2021 6:18 AM  Performed by: Adina Mcardle, MD  Authorized by: Adina Mcardle, MD     Pre-procedure:   Indications: at surgeon's request and primary anesthetic  Preanesthetic Checklist: patient identified, risks and benefits discussed, anesthesia consent, site marked, patient being monitored and timeout performed    Timeout Time: 06:16          Spinal Block:   Patient Position:  Seated  Prep Region:  Lumbar  Prep: chlorhexidine      Location:  L3-4  Technique:  Single shot        Needle:   Needle Type:  Pencan  Needle Gauge:  25 G  Attempts:  1      Events: CSF confirmed, no blood with aspiration and no paresthesia        Assessment:  Insertion:  Uncomplicated  Patient tolerance:  Patient tolerated the procedure well with no immediate complications

## 2021-02-21 NOTE — ANESTHESIA PREPROCEDURE EVALUATION
Relevant Problems   No relevant active problems       Anesthetic History   No history of anesthetic complications            Review of Systems / Medical History  Patient summary reviewed and pertinent labs reviewed    Pulmonary  Within defined limits                 Neuro/Psych         Psychiatric history     Cardiovascular                  Exercise tolerance: >4 METS     GI/Hepatic/Renal  Within defined limits              Endo/Other      Hypothyroidism  Anemia     Other Findings   Comments: previa           Physical Exam    Airway  Mallampati: II  TM Distance: 4 - 6 cm  Neck ROM: normal range of motion   Mouth opening: Normal     Cardiovascular    Rhythm: regular           Dental         Pulmonary  Breath sounds clear to auscultation               Abdominal         Other Findings            Anesthetic Plan    ASA: 3, emergent  Anesthesia type: spinal            Anesthetic plan and risks discussed with: Patient      Previa with vaginal bleeding. Hgb 10.3.   Plan for spinal if time

## 2021-02-22 LAB
ABO + RH BLD: NORMAL
BLD PROD TYP BPU: NORMAL
BLD PROD TYP BPU: NORMAL
BLOOD GROUP ANTIBODIES SERPL: NORMAL
BPU ID: NORMAL
BPU ID: NORMAL
CROSSMATCH RESULT,%XM: NORMAL
CROSSMATCH RESULT,%XM: NORMAL
SPECIMEN EXP DATE BLD: NORMAL
STATUS OF UNIT,%ST: NORMAL
STATUS OF UNIT,%ST: NORMAL
UNIT DIVISION, %UDIV: 0
UNIT DIVISION, %UDIV: 0

## 2021-03-04 NOTE — DISCHARGE SUMMARY
Obstetrical Discharge Summary     Name: Dash Edwards MRN: [id number]  SSN: [id number]    YOB: 1975  Age: 39 y.o. Sex: female      Allergies: Niacin    Admit Date: 2021    Discharge Date: 3/4/2021     Admitting Physician: Meghan Nelson MD     Attending Physician:  No att. providers found     * Admission Diagnoses: Vaginal bleeding in pregnancy, third trimester [O46.93]  Placenta previa with hemorrhage in third trimester [O44.13]  Antepartum hemorrhage from placenta previa [O44.10]    * Discharge Diagnoses:   Information for the patient's :  Monica Parisi [[id number]]   Delivery of a 1.715 kg female infant via , Low Transverse on 2021 at 6:33 AM  by Meghan Nelson. Apgars were 6  and 8 . Additional Diagnoses:   Hospital Problems as of 2021 Date Reviewed: 2021          Codes Class Noted - Resolved POA    Vaginal bleeding in pregnancy, third trimester ICD-10-CM: O46.93  ICD-9-CM: 641.93  2021 - Present Unknown        Placenta previa with hemorrhage in third trimester ICD-10-CM: O44.13  ICD-9-CM: 641.13  2021 - Present Unknown        Antepartum hemorrhage from placenta previa ICD-10-CM: O44.10  ICD-9-CM: 641.13  2021 - Present Unknown             Lab Results   Component Value Date/Time    ABO/Rh(D) A POSITIVE 2021 03:43 AM    Rubella, External immune 2020    ABO,Rh A+ 2021      There is no immunization history on file for this patient. * Procedures:   Procedure(s):   SECTION           * Discharge Condition: good    * Hospital Course: Normal hospital course following the delivery. Pt transferred to [facility] only to be with baby. * Disposition: PERRY transfer    Discharge Medications:   Discharge Medication List as of 2021  6:33 PM      CONTINUE these medications which have NOT CHANGED    Details   sertraline (ZOLOFT) 100 mg tablet Take 1 Tab by mouth nightly.  Start 1/2 tab for 7 days then increase to 1 tablet, Normal, Disp-30 Tab, R-11      levothyroxine (SYNTHROID) 112 mcg tablet Take 1 Tab by mouth Daily (before breakfast). , Normal, Disp-30 Tab, R-5      aspirin delayed-release 81 mg tablet Take  by mouth daily. , Historical Med      cholecalciferol, vitamin D3, (Vitamin D3) 50 mcg (2,000 unit) tab Take  by mouth., Historical Med      calcium carbonate (TUMS) 200 mg calcium (500 mg) chew Take 1 Tab by mouth daily. , Historical Med      prenatal vit-iron fumarate-fa (Right Step Prenatal Vitamins) 27 mg iron- 0.8 mg tab tablet Take 1 Tab by mouth daily. , Historical Med      acetaminophen (Tylenol Extra Strength) 500 mg tablet Take  by mouth every six (6) hours as needed for Pain., Historical Med             * Follow-up Care/Patient Instructions:   Activity: Activity as tolerated  Diet: Regular Diet  Wound Care: Keep wound clean and dry    Follow-up Information    None

## 2021-07-13 ENCOUNTER — TRANSCRIBE ORDER (OUTPATIENT)
Dept: SCHEDULING | Age: 46
End: 2021-07-13

## 2021-07-13 DIAGNOSIS — Z12.31 ENCOUNTER FOR SCREENING MAMMOGRAM FOR MALIGNANT NEOPLASM OF BREAST: Primary | ICD-10-CM

## 2021-08-09 ENCOUNTER — HOSPITAL ENCOUNTER (EMERGENCY)
Age: 46
Discharge: HOME | End: 2021-08-09
Attending: EMERGENCY MEDICINE
Payer: OTHER GOVERNMENT

## 2021-08-09 VITALS
DIASTOLIC BLOOD PRESSURE: 62 MMHG | HEART RATE: 81 BPM | RESPIRATION RATE: 16 BRPM | OXYGEN SATURATION: 100 % | WEIGHT: 164 LBS | BODY MASS INDEX: 27.32 KG/M2 | HEIGHT: 65 IN | SYSTOLIC BLOOD PRESSURE: 95 MMHG | TEMPERATURE: 98.4 F

## 2021-08-09 DIAGNOSIS — M54.42 ACUTE LEFT-SIDED LOW BACK PAIN WITH LEFT-SIDED SCIATICA: Primary | ICD-10-CM

## 2021-08-09 PROCEDURE — 74011250637 HC RX REV CODE- 250/637: Performed by: PHYSICIAN ASSISTANT

## 2021-08-09 PROCEDURE — 74011250636 HC RX REV CODE- 250/636: Performed by: PHYSICIAN ASSISTANT

## 2021-08-09 PROCEDURE — 96372 THER/PROPH/DIAG INJ SC/IM: CPT

## 2021-08-09 PROCEDURE — 99283 EMERGENCY DEPT VISIT LOW MDM: CPT

## 2021-08-09 RX ORDER — PREDNISONE 20 MG/1
20 TABLET ORAL DAILY
Qty: 15 TABLET | Refills: 0 | Status: SHIPPED | OUTPATIENT
Start: 2021-08-09 | End: 2021-08-14

## 2021-08-09 RX ORDER — CYCLOBENZAPRINE HCL 10 MG
10 TABLET ORAL
Status: COMPLETED | OUTPATIENT
Start: 2021-08-09 | End: 2021-08-09

## 2021-08-09 RX ORDER — KETOROLAC TROMETHAMINE 30 MG/ML
30 INJECTION, SOLUTION INTRAMUSCULAR; INTRAVENOUS
Status: COMPLETED | OUTPATIENT
Start: 2021-08-09 | End: 2021-08-09

## 2021-08-09 RX ORDER — CYCLOBENZAPRINE HCL 10 MG
10 TABLET ORAL
Qty: 10 TABLET | Refills: 0 | Status: SHIPPED | OUTPATIENT
Start: 2021-08-09 | End: 2021-08-19

## 2021-08-09 RX ADMIN — KETOROLAC TROMETHAMINE 30 MG: 30 INJECTION, SOLUTION INTRAMUSCULAR at 10:18

## 2021-08-09 RX ADMIN — CYCLOBENZAPRINE 10 MG: 10 TABLET, FILM COATED ORAL at 10:18

## 2021-08-09 RX ADMIN — METHYLPREDNISOLONE SODIUM SUCCINATE 125 MG: 125 INJECTION, POWDER, FOR SOLUTION INTRAMUSCULAR; INTRAVENOUS at 10:17

## 2021-08-09 NOTE — ED TRIAGE NOTES
Pt states lower back pain with hx of bulging disc but pain has gotten much worse over the past three days. States she has been doing PT since having  in Feb. Masked for triage.

## 2021-08-09 NOTE — ED NOTES
I have reviewed discharge instructions with the patient. The patient verbalized understanding. Patient left ED via Discharge Method: wheelchair to Home with self. Opportunity for questions and clarification provided. Patient given 2 scripts. To continue your aftercare when you leave the hospital, you may receive an automated call from our care team to check in on how you are doing. This is a free service and part of our promise to provide the best care and service to meet your aftercare needs.  If you have questions, or wish to unsubscribe from this service please call [phone]. Thank you for Choosing our [facility] Emergency Department.

## 2021-08-09 NOTE — ED PROVIDER NOTES
Patient is a 44-year-old female who presents with chief complaint of chronic sciatic back pain. States she has 2 herniated disks, has been improving with chiropractic visits however she recently moved back to [place] has not seen chiropractor in many weeks. Patient denies any new trauma associated with her increase in back pain. Pain radiates on the left leg, posterior aspect. Patient denies any bladder or bowel incontinence. Pain is worse with ambulation and bending and lifting and twisting. Denies fever, chills, night sweats, weight loss. Back Pain   Pertinent negatives include no chest pain, no fever, no headaches and no abdominal pain.         Past Medical History:   Diagnosis Date    Anemia     iron supplements as a child    Anxiety     was on celexa    Blighted ovum 2011    Hypothyroidism affecting pregnancy in first trimester 12/3/2020    Infertility, female     Psychiatric disorder     depression       Past Surgical History:   Procedure Laterality Date    APPENDECTOMY,W OTHR C      HX APPENDECTOMY           Family History:   Problem Relation Age of Onset    Diabetes Maternal Grandmother     Stroke Maternal Grandmother        Social History     Socioeconomic History    Marital status:      Spouse name: Not on file    Number of children: Not on file    Years of education: Not on file    Highest education level: Not on file   Occupational History    Not on file   Tobacco Use    Smoking status: Former Smoker     Packs/day: 0.25     Quit date: 2011     Years since quittin.8    Smokeless tobacco: Never Used   Substance and Sexual Activity    Alcohol use: Not Currently     Comment: occ    Drug use: No    Sexual activity: Yes     Partners: Male     Birth control/protection: Pill   Other Topics Concern    Not on file   Social History Narrative    Not on file     Social Determinants of Health     Financial Resource Strain:     Difficulty of Paying Living Expenses:    Food Insecurity:     Worried About Running Out of Food in the Last Year:     [address] in the Last Year:    Transportation Needs:     Lack of Transportation (Medical):  Lack of Transportation (Non-Medical):    Physical Activity:     Days of Exercise per Week:     Minutes of Exercise per Session:    Stress:     Feeling of Stress :    Social Connections:     Frequency of Communication with Friends and Family:     Frequency of Social Gatherings with Friends and Family:     Attends Buddhism Services:     Active Member of Clubs or Organizations:     Attends Club or Organization Meetings:     Marital Status:    Intimate Partner Violence:     Fear of Current or Ex-Partner:     Emotionally Abused:     Physically Abused:     Sexually Abused: ALLERGIES: Niacin    Review of Systems   Constitutional: Negative for chills and fever. HENT: Negative for facial swelling. Respiratory: Negative for chest tightness and shortness of breath. Cardiovascular: Negative for chest pain. Gastrointestinal: Negative for abdominal pain, nausea and vomiting. Musculoskeletal: Positive for back pain. Negative for myalgias. Neurological: Negative for headaches. Psychiatric/Behavioral: Negative for confusion. All other systems reviewed and are negative. There were no vitals filed for this visit. Physical Exam  Vitals and nursing note reviewed. Constitutional:       Appearance: Normal appearance. HENT:      Head: Normocephalic and atraumatic. Mouth/Throat:      Mouth: Mucous membranes are moist.   Eyes:      Pupils: Pupils are equal, round, and reactive to light. Cardiovascular:      Rate and Rhythm: Normal rate and regular rhythm. Pulmonary:      Effort: No respiratory distress. Breath sounds: Normal breath sounds. Abdominal:      Palpations: Abdomen is soft. Tenderness: There is no abdominal tenderness. There is no guarding.    Musculoskeletal: Cervical back: Normal.      Thoracic back: Normal.      Lumbar back: Tenderness present. Positive left straight leg raise test.   Skin:     General: Skin is warm and dry. Neurological:      Mental Status: She is alert and oriented to person, place, and time. Mental status is at baseline. Psychiatric:         Mood and Affect: Mood normal.          MDM  Number of Diagnoses or Management Options  Acute left-sided low back pain with left-sided sciatica  Diagnosis management comments: Patient states her pain is better after medications here in the emergency room. She was given Flexeril, Toradol. She will be given chiropractic follow-up as well as orthopedic follow-up. Patient is amenable to this course of treatment. She is ambulatory with a nonantalgic gait, she has no bladder or bowel incontinence. No concern for acute cord compression syndrome this patient. At time of discharge he is resting comfortably no acute distress. Normal vital signs. Urine is negative for UTI. Voice dictation software was used during the making of this note. This software is not perfect and grammatical and other typographical errors may be present. This note has been proofread, but may still contain errors.    Diana Carrillo PA-C     Risk of Complications, Morbidity, and/or Mortality  Presenting problems: low  Diagnostic procedures: low  Management options: moderate           Procedures

## 2021-08-09 NOTE — LETTER
[facility] EMERGENCY DEPT  [address] [address] [phone]    Work/School Note    Date: 8/9/2021    To Whom It May concern:    Radha Mabry was seen and treated today in the emergency room by the following provider(s):  Attending Provider: Yumi Kruse MD  Physician Assistant: Josue Hairston. Radha Mabry may return to work on 8/12/21 if she is better.     Sincerely,          Enzo Pierer RN

## 2021-10-01 ENCOUNTER — HOSPITAL ENCOUNTER (EMERGENCY)
Age: 46
Discharge: HOME | End: 2021-10-01
Attending: EMERGENCY MEDICINE
Payer: OTHER GOVERNMENT

## 2021-10-01 VITALS
BODY MASS INDEX: 28.32 KG/M2 | WEIGHT: 170 LBS | OXYGEN SATURATION: 98 % | HEART RATE: 76 BPM | RESPIRATION RATE: 16 BRPM | DIASTOLIC BLOOD PRESSURE: 66 MMHG | SYSTOLIC BLOOD PRESSURE: 92 MMHG | HEIGHT: 65 IN | TEMPERATURE: 98.2 F

## 2021-10-01 DIAGNOSIS — G89.29 CHRONIC MIDLINE LOW BACK PAIN WITH LEFT-SIDED SCIATICA: Primary | ICD-10-CM

## 2021-10-01 DIAGNOSIS — M54.42 CHRONIC MIDLINE LOW BACK PAIN WITH LEFT-SIDED SCIATICA: Primary | ICD-10-CM

## 2021-10-01 LAB — HCG UR QL: NEGATIVE

## 2021-10-01 PROCEDURE — 81025 URINE PREGNANCY TEST: CPT

## 2021-10-01 PROCEDURE — 74011250637 HC RX REV CODE- 250/637: Performed by: EMERGENCY MEDICINE

## 2021-10-01 PROCEDURE — 96372 THER/PROPH/DIAG INJ SC/IM: CPT

## 2021-10-01 PROCEDURE — 99283 EMERGENCY DEPT VISIT LOW MDM: CPT

## 2021-10-01 PROCEDURE — 74011250636 HC RX REV CODE- 250/636: Performed by: EMERGENCY MEDICINE

## 2021-10-01 RX ORDER — HYDROCODONE BITARTRATE AND ACETAMINOPHEN 5; 325 MG/1; MG/1
1 TABLET ORAL ONCE
Status: COMPLETED | OUTPATIENT
Start: 2021-10-01 | End: 2021-10-01

## 2021-10-01 RX ORDER — METHOCARBAMOL 500 MG/1
500 TABLET, FILM COATED ORAL
Qty: 30 TABLET | Refills: 0 | Status: SHIPPED | OUTPATIENT
Start: 2021-10-01

## 2021-10-01 RX ORDER — DIAZEPAM 5 MG/1
5 TABLET ORAL
Status: COMPLETED | OUTPATIENT
Start: 2021-10-01 | End: 2021-10-01

## 2021-10-01 RX ORDER — KETOROLAC TROMETHAMINE 30 MG/ML
30 INJECTION, SOLUTION INTRAMUSCULAR; INTRAVENOUS
Status: COMPLETED | OUTPATIENT
Start: 2021-10-01 | End: 2021-10-01

## 2021-10-01 RX ORDER — NAPROXEN SODIUM 550 MG/1
550 TABLET ORAL
Qty: 20 TABLET | Refills: 0 | Status: SHIPPED | OUTPATIENT
Start: 2021-10-01

## 2021-10-01 RX ADMIN — DIAZEPAM 5 MG: 5 TABLET ORAL at 20:56

## 2021-10-01 RX ADMIN — HYDROCODONE BITARTRATE AND ACETAMINOPHEN 1 TABLET: 5; 325 TABLET ORAL at 20:56

## 2021-10-01 RX ADMIN — KETOROLAC TROMETHAMINE 30 MG: 30 INJECTION, SOLUTION INTRAMUSCULAR; INTRAVENOUS at 20:55

## 2021-10-02 NOTE — ED NOTES
I have reviewed discharge instructions with the patient. The patient verbalized understanding. Patient left ED via Discharge Method: ambulatory to Home with spouse. Opportunity for questions and clarification provided. Patient given 2 scripts. To continue your aftercare when you leave the hospital, you may receive an automated call from our care team to check in on how you are doing. This is a free service and part of our promise to provide the best care and service to meet your aftercare needs.  If you have questions, or wish to unsubscribe from this service please call [phone]. Thank you for Choosing our [facility] Emergency Department.

## 2021-10-02 NOTE — ED PROVIDER NOTES
Patient presents the ER with complaints of back pain. Patient states she has had recurrent issues with back pain for couple weeks. States is worsened over the past couple days. States pain is mostly in her lower back and radiates to her left side. Reports he is scheduled to see a spine surgeon about this. Has had recent outpatient MRI for similar complaints. Normally only takes over-the-counter medications with has not brought her any relief today. Denies any recent trauma. Denies any incontinence of bowel or bladder. The history is provided by the patient. Back Pain   This is a chronic problem. The current episode started more than 1 week ago. The problem has been rapidly improving. The pain is present in the lower back. The quality of the pain is described as stabbing and shooting. The pain radiates to the left thigh and left foot. The pain is at a severity of 4/10. The pain is moderate. Associated symptoms include leg pain. Pertinent negatives include no chest pain, no fever, no abdominal pain, no bowel incontinence, no bladder incontinence, no dysuria, no paresis, no tingling and no weakness. She has tried nothing for the symptoms.         Past Medical History:   Diagnosis Date    Anemia     iron supplements as a child    Anxiety 2011    was on celexa    Blighted ovum 11/22/2011    Hypothyroidism affecting pregnancy in first trimester 12/3/2020    Infertility, female     Psychiatric disorder     depression    Sciatica        Past Surgical History:   Procedure Laterality Date    APPENDECTOMY,W OTHR C      HX APPENDECTOMY           Family History:   Problem Relation Age of Onset    Diabetes Maternal Grandmother     Stroke Maternal Grandmother        Social History     Socioeconomic History    Marital status:      Spouse name: Not on file    Number of children: Not on file    Years of education: Not on file    Highest education level: Not on file   Occupational History    Not on file   Tobacco Use    Smoking status: Former Smoker     Packs/day: 0.25     Quit date: 9/22/2011     Years since quitting: 10.0    Smokeless tobacco: Never Used   Substance and Sexual Activity    Alcohol use: Not Currently     Comment: occ    Drug use: No    Sexual activity: Yes     Partners: Male     Birth control/protection: Pill   Other Topics Concern    Not on file   Social History Narrative    Not on file     Social Determinants of Health     Financial Resource Strain:     Difficulty of Paying Living Expenses:    Food Insecurity:     Worried About Running Out of Food in the Last Year:     [address] in the Last Year:    Transportation Needs:     Lack of Transportation (Medical):  Lack of Transportation (Non-Medical):    Physical Activity:     Days of Exercise per Week:     Minutes of Exercise per Session:    Stress:     Feeling of Stress :    Social Connections:     Frequency of Communication with Friends and Family:     Frequency of Social Gatherings with Friends and Family:     Attends Episcopalian Services:     Active Member of Clubs or Organizations:     Attends Club or Organization Meetings:     Marital Status:    Intimate Partner Violence:     Fear of Current or Ex-Partner:     Emotionally Abused:     Physically Abused:     Sexually Abused: ALLERGIES: Niacin    Review of Systems   Constitutional: Negative for fever. HENT: Negative for congestion, dental problem, trouble swallowing and voice change. Eyes: Negative for photophobia, redness and visual disturbance. Respiratory: Negative for apnea, chest tightness, shortness of breath and stridor. Cardiovascular: Negative for chest pain and leg swelling. Gastrointestinal: Negative for abdominal pain, bowel incontinence, nausea and vomiting. Endocrine: Negative for polydipsia. Genitourinary: Negative for bladder incontinence, dyspareunia, dysuria, flank pain and urgency.    Musculoskeletal: Positive for back pain. Negative for gait problem and joint swelling. Skin: Negative for color change and pallor. Neurological: Negative for tingling, tremors, weakness and light-headedness. Hematological: Negative for adenopathy. Does not bruise/bleed easily. Psychiatric/Behavioral: Negative for behavioral problems and confusion. All other systems reviewed and are negative. Vitals:    10/01/21 2016   BP: 92/66   Pulse: 76   Resp: 16   Temp: 98.2 °F (36.8 °C)   SpO2: 98%   Weight: 77.1 kg (170 lb)   Height: 5' 5\" (1.651 m)            Physical Exam  Vitals and nursing note reviewed. Constitutional:       General: She is not in acute distress. Appearance: Normal appearance. She is not ill-appearing. HENT:      Head: Normocephalic and atraumatic. Right Ear: External ear normal.      Left Ear: External ear normal.      Nose: Nose normal. No congestion or rhinorrhea. Mouth/Throat:      Pharynx: No oropharyngeal exudate or posterior oropharyngeal erythema. Eyes:      Extraocular Movements: Extraocular movements intact. Pupils: Pupils are equal, round, and reactive to light. Cardiovascular:      Rate and Rhythm: Normal rate and regular rhythm. Pulses: Normal pulses. Heart sounds: Normal heart sounds. No murmur heard. No friction rub. No gallop. Pulmonary:      Effort: No respiratory distress. Breath sounds: No stridor. No wheezing or rhonchi. Abdominal:      General: Abdomen is flat. There is no distension. Palpations: Abdomen is soft. There is no mass. Tenderness: There is no abdominal tenderness. Musculoskeletal:         General: No swelling or tenderness. Normal range of motion. Cervical back: Normal range of motion. No rigidity or tenderness. Skin:     Capillary Refill: Capillary refill takes less than 2 seconds. Coloration: Skin is not jaundiced or pale. Findings: No bruising or erythema. Neurological:      General: No focal deficit present. Mental Status: She is alert and oriented to person, place, and time. Cranial Nerves: No cranial nerve deficit. Sensory: No sensory deficit. Motor: No weakness. Psychiatric:         Mood and Affect: Mood normal.         Behavior: Behavior normal.          MDM  Number of Diagnoses or Management Options  Diagnosis management comments: We will try to review outside records, treat symptomatically otherwise    10:02 PM  Urine here normal.  Patient feels better to being treated symptomatically. Plan to discharge home. Encourage close follow-up with outpatient spine surgeon. Amount and/or Complexity of Data Reviewed  Clinical lab tests: ordered and reviewed  Tests in the radiology section of CPT®: reviewed  Decide to obtain previous medical records or to obtain history from someone other than the patient: yes (   Name: Harley Bennett  YOB: 1975 39 y.o. Gender: female  MRN: [id number]     CC: Follow-up left buttock and hip pain, low back pain     History of Present Illness:       This is a very pleasant 39y.o. year old female who presents with a multimonth history of back pain with episodic radiation to the left buttock and lower extremity. Patient has complaints of pain in the left buttock and hip radiating down the ankle in the posterior leg distribution. Prior to pregnancy she had this in the right leg and now it is in the left. She is completed 6 months of physical therapy. She is currently also in pelvic PT. She was treated in the emergency room August 9 with Toradol, steroids and Flexeril. She is also had a steroid injection. She cannot tolerate hydrocodone. Thus far, the patient has tried : Physical therapy within the past 6 months, Flexeril, prednisone, hydrocodone, Toradol, oral and injectable steroids.     At last visit patient was referred for an MRI of the lumbar spine.              Current Outpatient Medications:     docusate sodium (COLACE) 100 mg capsule, Take 1 capsule by mouth 2 times daily as needed for constipation, Disp: , Rfl:     influenza vaccine 2020-21, 36 mos+,,PF, (Afluria Qd 2020-21,3yr up,,PF,) syrg injection, Afluria Qd 2020-21 (36 mos up)(PF)60 mcg (15 mcg x4)/0.5 mL IM syringe  ADM 0.5ML IM UTD, Disp: , Rfl:     ibuprofen (MOTRIN) 600 mg tablet, Take 600 mg (1 tablet) by mouth every 6 hours as needed for pain, Disp: , Rfl:     sertraline (ZOLOFT) 100 mg tablet, Take 1 Tab by mouth nightly. Start 1/2 tab for 7 days then increase to 1 tablet, Disp: 30 Tab, Rfl: 11    levothyroxine (SYNTHROID) 112 mcg tablet, Take 1 Tab by mouth Daily (before breakfast). , Disp: 30 Tab, Rfl: 5    aspirin delayed-release 81 mg tablet, Take  by mouth daily. , Disp: , Rfl:     cholecalciferol, vitamin D3, (Vitamin D3) 50 mcg (2,000 unit) tab, Take  by mouth., Disp: , Rfl:     calcium carbonate (TUMS) 200 mg calcium (500 mg) chew, Take 1 Tab by mouth daily. , Disp: , Rfl:     acetaminophen (Tylenol Extra Strength) 500 mg tablet, Take  by mouth every six (6) hours as needed for Pain., Disp: , Rfl:          Allergies  Allergen Reactions   Niacin Rash              Radiographic Studies:   MRI of the lumbar spine images independently reviewed: There is facet arthropathy from L3-S1. At L4-5 there is lateral recess stenosis bilaterally and some impingement or abutment of the transversing L5 nerve roots. At L5-S1 there is left lateral recess narrowing and impingement of the left S1 nerve root. There is mild foraminal stenosis.          Assessment: Back and bilateral hip and leg pain due to lateral recess narrowing from L4-S1. Patient also has lower lumbar facet arthropathy.         ICD-10-CM ICD-9-CM    1. Stenosis of lateral recess of lumbar spine  M48.061 724.02    2. Lumbar degenerative disc disease  M51.36 722. 52    3. Lumbar radiculopathy  M54.16 724. 4             Plan: We reviewed her MRI images in detail and discussed the pathophysiology of the findings.   We discussed injection management. Patient is having a hard time because she is in the [employer] and she is got to keep a certain weight which her pain keeps her from being able to exercise like she would like to. She is nervous about the steroids causing her weight gain. We did discuss however it could improve quality of life. Briefly discussed expected outcomes of lumbar epidural injections. We also briefly discussed surgery if she wanted to visit that option. She is going to think about what we discussed today and let me know what she would like to proceed with.     ----The patient will be treated observantly with self directed symptomatic care. Instructions were given to follow up if there is any neurologic or functional decline.        No orders of the defined types were placed in this encounter.        Follow-up and Dispositions   Return will call for injections.           Electronically Signed By: Trinh Pedroza NP  08/25/21  11:08 AM  )  Review and summarize past medical records: yes    Risk of Complications, Morbidity, and/or Mortality  Presenting problems: moderate  Diagnostic procedures: moderate  Management options: moderate           Procedures               Results Include:    Recent Results (from the past 24 hour(s))   HCG URINE, QL. - POC    Collection Time: 10/01/21  9:03 PM   Result Value Ref Range    Pregnancy test,urine (POC) Negative NEG       Voice dictation software was used during the making of this note. This software is not perfect and grammatical and other typographical errors may be present. This note has been proofread, but may still contain errors.   Margo Weinberg MD; 10/1/2021 @10:02 PM   ===================================================================

## 2021-10-02 NOTE — ED TRIAGE NOTES
Pt c/o left side sciatica pain X3 days she states radiates up to the left side of her neck and down to her left ankle. Pt reports history of bulging discs. Pt denies taking pain medications. Pt masked.

## 2021-10-02 NOTE — DISCHARGE INSTRUCTIONS
Take medications as prescribed  Follow-up with your spine surgeon  Return to the ER for any new, worsening or life-threatening symptoms

## 2021-10-03 ENCOUNTER — HOSPITAL ENCOUNTER (EMERGENCY)
Age: 46
Discharge: HOME | End: 2021-10-03
Attending: EMERGENCY MEDICINE
Payer: OTHER GOVERNMENT

## 2021-10-03 VITALS
OXYGEN SATURATION: 100 % | HEIGHT: 65 IN | WEIGHT: 170 LBS | HEART RATE: 78 BPM | SYSTOLIC BLOOD PRESSURE: 100 MMHG | TEMPERATURE: 98.1 F | RESPIRATION RATE: 18 BRPM | BODY MASS INDEX: 28.32 KG/M2 | DIASTOLIC BLOOD PRESSURE: 80 MMHG

## 2021-10-03 DIAGNOSIS — M54.42 ACUTE MIDLINE LOW BACK PAIN WITH LEFT-SIDED SCIATICA: Primary | ICD-10-CM

## 2021-10-03 PROCEDURE — 96372 THER/PROPH/DIAG INJ SC/IM: CPT

## 2021-10-03 PROCEDURE — 74011250636 HC RX REV CODE- 250/636: Performed by: EMERGENCY MEDICINE

## 2021-10-03 PROCEDURE — 99283 EMERGENCY DEPT VISIT LOW MDM: CPT

## 2021-10-03 PROCEDURE — 74011250637 HC RX REV CODE- 250/637: Performed by: EMERGENCY MEDICINE

## 2021-10-03 RX ORDER — PREDNISONE 10 MG/1
TABLET ORAL
Qty: 48 TABLET | Refills: 0 | Status: SHIPPED | OUTPATIENT
Start: 2021-10-03

## 2021-10-03 RX ORDER — TRAMADOL HYDROCHLORIDE 50 MG/1
50 TABLET ORAL
Qty: 11 TABLET | Refills: 0 | Status: SHIPPED | OUTPATIENT
Start: 2021-10-03 | End: 2021-10-06

## 2021-10-03 RX ORDER — MORPHINE SULFATE 4 MG/ML
4 INJECTION INTRAVENOUS ONCE
Status: COMPLETED | OUTPATIENT
Start: 2021-10-03 | End: 2021-10-03

## 2021-10-03 RX ORDER — ONDANSETRON 4 MG/1
4 TABLET, ORALLY DISINTEGRATING ORAL
Status: COMPLETED | OUTPATIENT
Start: 2021-10-03 | End: 2021-10-03

## 2021-10-03 RX ADMIN — ONDANSETRON 4 MG: 4 TABLET, ORALLY DISINTEGRATING ORAL at 11:20

## 2021-10-03 RX ADMIN — MORPHINE SULFATE 4 MG: 4 INJECTION INTRAVENOUS at 11:20

## 2021-10-03 NOTE — ED PROVIDER NOTES
82-year-old lady presents with concerns about back pain. She has been dealing with back pain for an extended time. She has been evaluated by her primary care doctor and a back specialist.  She has historically had some concerns with various treatment modalities because she is in the [employer] she felt her options were limited. Specifically she was worried about steroids because of weight gain and needing to maintain a certain fitness level for the [employer]. She said that she followed up with the [facility] and was offered some injections and may be consideration of surgery. She said that she is going to get a second opinion with a different back specialist on October 14. She does have pain that radiates down the back of her left leg. She said she has not had any bowel or bladder incontinence and no loss of pelvic sensation. She reports trying multiple different medicines but historically had not tried any narcotic pain medicines. She said the most recent Robaxin and anti-inflammatories were not helping. No other associated symptoms. Elements of this note were created using speech recognition software. As such, errors of speech recognition may be present.            Past Medical History:   Diagnosis Date    Anemia     iron supplements as a child    Anxiety 2011    was on celexa    Blighted ovum 11/22/2011    Hypothyroidism affecting pregnancy in first trimester 12/3/2020    Infertility, female     Psychiatric disorder     depression    Sciatica        Past Surgical History:   Procedure Laterality Date    APPENDECTOMY,W OTHR C      HX APPENDECTOMY           Family History:   Problem Relation Age of Onset    Diabetes Maternal Grandmother     Stroke Maternal Grandmother        Social History     Socioeconomic History    Marital status:      Spouse name: Not on file    Number of children: Not on file    Years of education: Not on file    Highest education level: Not on file   Occupational History    Not on file   Tobacco Use    Smoking status: Former Smoker     Packs/day: 0.25     Quit date: 9/22/2011     Years since quitting: 10.0    Smokeless tobacco: Never Used   Substance and Sexual Activity    Alcohol use: Not Currently     Comment: occ    Drug use: No    Sexual activity: Yes     Partners: Male     Birth control/protection: Pill   Other Topics Concern    Not on file   Social History Narrative    Not on file     Social Determinants of Health     Financial Resource Strain:     Difficulty of Paying Living Expenses:    Food Insecurity:     Worried About Running Out of Food in the Last Year:     [address] in the Last Year:    Transportation Needs:     Lack of Transportation (Medical):  Lack of Transportation (Non-Medical):    Physical Activity:     Days of Exercise per Week:     Minutes of Exercise per Session:    Stress:     Feeling of Stress :    Social Connections:     Frequency of Communication with Friends and Family:     Frequency of Social Gatherings with Friends and Family:     Attends Jain Services:     Active Member of Clubs or Organizations:     Attends Club or Organization Meetings:     Marital Status:    Intimate Partner Violence:     Fear of Current or Ex-Partner:     Emotionally Abused:     Physically Abused:     Sexually Abused: ALLERGIES: Niacin    Review of Systems   Constitutional: Negative for chills and fever. HENT: Negative for congestion and rhinorrhea. Respiratory: Negative for cough and shortness of breath. Genitourinary: Negative for difficulty urinating, dysuria and hematuria. Musculoskeletal: Positive for back pain. Negative for arthralgias, gait problem and joint swelling. Neurological: Negative for weakness and numbness.        Vitals:    10/03/21 1040   BP: (!) 93/57   Pulse: 73   Resp: 18   Temp: 98.2 °F (36.8 °C)   SpO2: 99%   Weight: 77.1 kg (170 lb)   Height: 5' 5\" (1.651 m)            Physical Exam  Vitals and nursing note reviewed. Constitutional:       Appearance: Normal appearance. Cardiovascular:      Rate and Rhythm: Normal rate and regular rhythm. Pulmonary:      Effort: Pulmonary effort is normal.      Breath sounds: Normal breath sounds. Neurological:      General: No focal deficit present. Mental Status: She is alert and oriented to person, place, and time. Comments: Normal patellar reflexes. Normal strength and sensation in both lower extremities. She does have pain with even minimal straight leg raise. [facility]  ED Course as of Oct 03 1309   Sun Oct 03, 2021   1307 Patient is feeling much better. She was able to sit up on the side of the bed without pain. I will discharge her home with some Ultram and steroids.     [AC]      ED Course User Index  [AC] Sahara Castanon MD       Procedures

## 2021-10-03 NOTE — ED TRIAGE NOTES
Pt presents to ED c/o left lower back pain and leg pain ongoing since February after spinal for . Hx of 3 bulging discs. Last MRI in august. Trying muscle relaxers, NSAIDs w/o relief of symptoms.  Has seen ortho NP.

## 2021-10-03 NOTE — DISCHARGE INSTRUCTIONS
Return with any fevers, vomiting, weakness, numbness, bowel or bladder problems, worsening symptoms, or additional concerns. Follow up with your regular doctors as planned.

## 2021-10-06 NOTE — ED NOTES
I have reviewed discharge instructions with the patient. The patient verbalized understanding. Patient left ED via Discharge Method: ambulatory to Home with self. Opportunity for questions and clarification provided. Patient given 2 scripts. To continue your aftercare when you leave the hospital, you may receive an automated call from our care team to check in on how you are doing. This is a free service and part of our promise to provide the best care and service to meet your aftercare needs.  If you have questions, or wish to unsubscribe from this service please call [phone]. Thank you for Choosing our [facility] Emergency Department. 11

## 2022-01-03 ENCOUNTER — HOSPITAL ENCOUNTER (EMERGENCY)
Age: 47
Discharge: HOME | End: 2022-01-03
Attending: EMERGENCY MEDICINE
Payer: OTHER GOVERNMENT

## 2022-01-03 VITALS
TEMPERATURE: 98.1 F | BODY MASS INDEX: 29.82 KG/M2 | DIASTOLIC BLOOD PRESSURE: 68 MMHG | SYSTOLIC BLOOD PRESSURE: 102 MMHG | HEIGHT: 65 IN | WEIGHT: 179 LBS | HEART RATE: 68 BPM | OXYGEN SATURATION: 100 % | RESPIRATION RATE: 18 BRPM

## 2022-01-03 DIAGNOSIS — Z20.822 SUSPECTED COVID-19 VIRUS INFECTION: Primary | ICD-10-CM

## 2022-01-03 LAB
COVID-19 RAPID TEST, COVR: NOT DETECTED
SARS-COV-2, COV2: NORMAL
SARS-COV-2, COV2: NOT DETECTED
SOURCE, COVRS: NORMAL
SPECIMEN SOURCE, FCOV2M: NORMAL

## 2022-01-03 PROCEDURE — U0005 INFEC AGEN DETEC AMPLI PROBE: HCPCS

## 2022-01-03 PROCEDURE — 87635 SARS-COV-2 COVID-19 AMP PRB: CPT

## 2022-01-03 PROCEDURE — 99282 EMERGENCY DEPT VISIT SF MDM: CPT

## 2022-01-03 NOTE — ED PROVIDER NOTES
51-year-old female who presents to the emergency room chief complaint of head cold. Had nasal congestion, runny nose. Fully vaccinated against Covid having received second shot of [place] in August.  She was exposed to [facility] Friday, Saturday and Sunday. Symptoms started this morning. No other medical complaints. Past Medical History:   Diagnosis Date    Anemia     iron supplements as a child    Anxiety 2011    was on celexa    Blighted ovum 11/22/2011    Hypothyroidism affecting pregnancy in first trimester 12/3/2020    Infertility, female     Psychiatric disorder     depression    Sciatica        Past Surgical History:   Procedure Laterality Date    APPENDECTOMY,W OTHR C      HX APPENDECTOMY           Family History:   Problem Relation Age of Onset    Diabetes Maternal Grandmother     Stroke Maternal Grandmother        Social History     Socioeconomic History    Marital status:      Spouse name: Not on file    Number of children: Not on file    Years of education: Not on file    Highest education level: Not on file   Occupational History    Not on file   Tobacco Use    Smoking status: Former Smoker     Packs/day: 0.25     Quit date: 9/22/2011     Years since quitting: 10.2    Smokeless tobacco: Never Used   Substance and Sexual Activity    Alcohol use: Not Currently     Comment: occ    Drug use: No    Sexual activity: Yes     Partners: Male     Birth control/protection: Pill   Other Topics Concern    Not on file   Social History Narrative    Not on file     Social Determinants of Health     Financial Resource Strain:     Difficulty of Paying Living Expenses: Not on file   Food Insecurity:     Worried About [address] in the Last Year: Not on file    [address] in the Last Year: Not on file   Transportation Needs:     Lack of Transportation (Medical): Not on file    Lack of Transportation (Non-Medical):  Not on file   Physical Activity:     Days of Exercise per Week: Not on file    Minutes of Exercise per Session: Not on file   Stress:     Feeling of Stress : Not on file   Social Connections:     Frequency of Communication with Friends and Family: Not on file    Frequency of Social Gatherings with Friends and Family: Not on file    Attends Hindu Services: Not on file    Active Member of [address] [place] [address] [employer] or Organizations: Not on file    Attends Club or Organization Meetings: Not on file    Marital Status: Not on file   Intimate Partner Violence:     Fear of Current or Ex-Partner: Not on file    Emotionally Abused: Not on file    Physically Abused: Not on file    Sexually Abused: Not on file   Housing Stability:     Unable to Pay for Housing in the Last Year: Not on file    Number of [place] in the Last Year: Not on file    Unstable Housing in the Last Year: Not on file         ALLERGIES: Niacin    Review of Systems   Constitutional: Positive for chills. Negative for fever. HENT: Positive for rhinorrhea. Negative for sneezing. Respiratory: Positive for cough. Gastrointestinal: Negative for nausea. Musculoskeletal: Positive for back pain. Neurological: Negative for dizziness. All other systems reviewed and are negative. There were no vitals filed for this visit. Physical Exam  Vitals and nursing note reviewed. Constitutional:       General: She is not in acute distress. Appearance: Normal appearance. She is normal weight. She is not ill-appearing, toxic-appearing or diaphoretic. HENT:      Head: Normocephalic and atraumatic. Nose: Congestion present. Mouth/Throat:      Mouth: Mucous membranes are moist.   Eyes:      Pupils: Pupils are equal, round, and reactive to light. Cardiovascular:      Rate and Rhythm: Normal rate. Pulses: Normal pulses. Pulmonary:      Effort: Pulmonary effort is normal. No respiratory distress. Abdominal:      General: Abdomen is flat.    Skin:     General: Skin is warm and dry. Neurological:      General: No focal deficit present. Mental Status: She is alert. MDM  Number of Diagnoses or Management Options  Suspected COVID-19 virus infection  Diagnosis management comments: Rapid Covid was negative here in the department. PCR swab was collected and will be sent out. Advised patient and patient spouse that we likely tested too early and that the PCR will probably be positive. They need to treat as if they have an acute Covid infection and quarantine at home. Keep an eye on oxygen saturations. Return if a drop below 92%. Patient and patient spouse verbalized understanding    Voice dictation software was used during the making of this note. This software is not perfect and grammatical and other typographical errors may be present. This note has been proofread, but may still contain errors.    Kalin Rouse PA-C        Amount and/or Complexity of Data Reviewed  Clinical lab tests: ordered and reviewed    Risk of Complications, Morbidity, and/or Mortality  Presenting problems: moderate  Diagnostic procedures: low  Management options: low    Patient Progress  Patient progress: improved         Procedures

## 2022-01-03 NOTE — DISCHARGE INSTRUCTIONS
Your rapid Covid was negative here in the department. Your PCR swab will result later this evening or early tomorrow. Given your exposure history it is likely that you have Covid and we have tested too early. Treat this as such. Quarantine at home, symptomatic treatment with Tylenol and ibuprofen. Return here for worsening or worrisome symptoms. If your oxygen drops below 92% that is also a reason to return to this department.

## 2022-01-03 NOTE — Clinical Note
[address] [address] EMERGENCY DEPT  [address] [address]  [phone]    Work/School Note    Date: 1/3/2022     To Whom It May concern:    Jolynn Espinal was evaluated by the following provider(s):  Attending Provider: Marilin Diaz MD  Physician Assistant: Christiano Dominguez, [address] [place] [address] virus is suspected. Per the CDC guidelines we recommend home isolation until the following conditions are all met:    1. At least five days have passed since symptoms first appeared and/or had a close exposure,   2. After home isolation for five days, wearing a mask around others for the next five days,  3. At least 24 have passed since last fever without the use of fever-reducing medications and  4.  Symptoms (eg cough, shortness of breath) have improved      Sincerely,          Vandana Oden

## 2022-01-03 NOTE — Clinical Note
[address] [address] EMERGENCY DEPT  [address] [address] [phone]    Work/School Note    Date: 1/3/2022     To Whom It May concern:    Lizzy Curtis was evaluated by the following provider(s):  Attending Provider: Claire Acevedo MD  Physician Assistant: Kamlesh Marina, [address] virus is suspected. Per the CDC guidelines we recommend home isolation until the following conditions are all met:    1. At least five days have passed since symptoms first appeared and/or had a close exposure,   2. After home isolation for five days, wearing a mask around others for the next five days,  3. At least 24 have passed since last fever without the use of fever-reducing medications and  4.  Symptoms (eg cough, shortness of breath) have improved      Sincerely,          DANIEL Diop

## 2022-01-03 NOTE — ED NOTES
I have reviewed discharge instructions with the patient. The patient verbalized understanding. Patient left ED via Discharge Method: ambulatory to Home with family. Opportunity for questions and clarification provided. Patient given 0 scripts. To continue your aftercare when you leave the hospital, you may receive an automated call from our care team to check in on how you are doing. This is a free service and part of our promise to provide the best care and service to meet your aftercare needs.  If you have questions, or wish to unsubscribe from this service please call [phone]. Thank you for Choosing our [facility] Emergency Department.

## 2022-03-18 PROBLEM — O44.02 PLACENTA PREVIA IN SECOND TRIMESTER: Status: ACTIVE | Noted: 2020-12-07

## 2022-03-18 PROBLEM — O44.13 PLACENTA PREVIA WITH HEMORRHAGE IN THIRD TRIMESTER: Status: ACTIVE | Noted: 2021-02-14

## 2022-03-19 PROBLEM — O99.342 DEPRESSION AFFECTING PREGNANCY IN SECOND TRIMESTER, ANTEPARTUM: Status: ACTIVE | Noted: 2021-01-21

## 2022-03-19 PROBLEM — O09.522 AMA (ADVANCED MATERNAL AGE) MULTIGRAVIDA 35+, SECOND TRIMESTER: Status: ACTIVE | Noted: 2020-12-07

## 2022-03-19 PROBLEM — O99.282 HYPOTHYROIDISM AFFECTING PREGNANCY IN SECOND TRIMESTER: Status: ACTIVE | Noted: 2020-12-03

## 2022-03-19 PROBLEM — F32.A DEPRESSION AFFECTING PREGNANCY IN SECOND TRIMESTER, ANTEPARTUM: Status: ACTIVE | Noted: 2021-01-21

## 2022-03-19 PROBLEM — O46.93 VAGINAL BLEEDING IN PREGNANCY, THIRD TRIMESTER: Status: ACTIVE | Noted: 2021-02-14

## 2022-03-19 PROBLEM — E03.9 HYPOTHYROIDISM AFFECTING PREGNANCY IN SECOND TRIMESTER: Status: ACTIVE | Noted: 2020-12-03

## 2022-03-19 PROBLEM — O09.812 HIGH RISK PREGNANCY DUE TO ASSISTED REPRODUCTIVE TECHNOLOGY IN SECOND TRIMESTER: Status: ACTIVE | Noted: 2020-12-03

## 2022-03-19 PROBLEM — O44.10 ANTEPARTUM HEMORRHAGE FROM PLACENTA PREVIA: Status: ACTIVE | Noted: 2021-02-14

## 2022-03-29 ENCOUNTER — APPOINTMENT (OUTPATIENT)
Dept: GENERAL RADIOLOGY | Age: 47
End: 2022-03-29
Payer: OTHER GOVERNMENT

## 2022-03-29 ENCOUNTER — HOSPITAL ENCOUNTER (EMERGENCY)
Age: 47
Discharge: HOME | End: 2022-03-29
Attending: EMERGENCY MEDICINE
Payer: OTHER GOVERNMENT

## 2022-03-29 VITALS
RESPIRATION RATE: 16 BRPM | DIASTOLIC BLOOD PRESSURE: 62 MMHG | HEART RATE: 66 BPM | WEIGHT: 168 LBS | TEMPERATURE: 97.6 F | BODY MASS INDEX: 27.99 KG/M2 | SYSTOLIC BLOOD PRESSURE: 99 MMHG | OXYGEN SATURATION: 100 % | HEIGHT: 65 IN

## 2022-03-29 DIAGNOSIS — M54.31 SCIATICA OF RIGHT SIDE: Primary | ICD-10-CM

## 2022-03-29 LAB
ALBUMIN SERPL-MCNC: 3.7 G/DL (ref 3.5–5)
ALBUMIN/GLOB SERPL: 0.8 {RATIO} (ref 1.2–3.5)
ALP SERPL-CCNC: 141 U/L (ref 50–136)
ALT SERPL-CCNC: 21 U/L (ref 12–65)
ANION GAP SERPL CALC-SCNC: 5 MMOL/L (ref 7–16)
AST SERPL-CCNC: 19 U/L (ref 15–37)
BILIRUB SERPL-MCNC: 0.3 MG/DL (ref 0.2–1.1)
BUN SERPL-MCNC: 15 MG/DL (ref 6–23)
CALCIUM SERPL-MCNC: 9.4 MG/DL (ref 8.3–10.4)
CHLORIDE SERPL-SCNC: 106 MMOL/L (ref 98–107)
CO2 SERPL-SCNC: 25 MMOL/L (ref 21–32)
CREAT SERPL-MCNC: 0.82 MG/DL (ref 0.6–1)
GLOBULIN SER CALC-MCNC: 4.4 G/DL (ref 2.3–3.5)
GLUCOSE SERPL-MCNC: 92 MG/DL (ref 65–100)
HCG UR QL: NEGATIVE
POTASSIUM SERPL-SCNC: 4 MMOL/L (ref 3.5–5.1)
PROT SERPL-MCNC: 8.1 G/DL (ref 6.3–8.2)
SODIUM SERPL-SCNC: 136 MMOL/L (ref 136–145)

## 2022-03-29 PROCEDURE — 74011250636 HC RX REV CODE- 250/636

## 2022-03-29 PROCEDURE — 72110 X-RAY EXAM L-2 SPINE 4/>VWS: CPT

## 2022-03-29 PROCEDURE — 81003 URINALYSIS AUTO W/O SCOPE: CPT

## 2022-03-29 PROCEDURE — 96374 THER/PROPH/DIAG INJ IV PUSH: CPT

## 2022-03-29 PROCEDURE — 74011250636 HC RX REV CODE- 250/636: Performed by: PHYSICIAN ASSISTANT

## 2022-03-29 PROCEDURE — 96375 TX/PRO/DX INJ NEW DRUG ADDON: CPT

## 2022-03-29 PROCEDURE — 74011250637 HC RX REV CODE- 250/637: Performed by: PHYSICIAN ASSISTANT

## 2022-03-29 PROCEDURE — 80053 COMPREHEN METABOLIC PANEL: CPT

## 2022-03-29 PROCEDURE — 81025 URINE PREGNANCY TEST: CPT

## 2022-03-29 PROCEDURE — 99284 EMERGENCY DEPT VISIT MOD MDM: CPT

## 2022-03-29 RX ORDER — MORPHINE SULFATE 4 MG/ML
4 INJECTION INTRAVENOUS
Status: COMPLETED | OUTPATIENT
Start: 2022-03-29 | End: 2022-03-29

## 2022-03-29 RX ORDER — CYCLOBENZAPRINE HCL 10 MG
10 TABLET ORAL
Status: COMPLETED | OUTPATIENT
Start: 2022-03-29 | End: 2022-03-29

## 2022-03-29 RX ORDER — HYDROCODONE BITARTRATE AND ACETAMINOPHEN 5; 325 MG/1; MG/1
1 TABLET ORAL 2 TIMES DAILY
Qty: 10 TABLET | Refills: 0 | Status: SHIPPED | OUTPATIENT
Start: 2022-03-29 | End: 2022-04-03

## 2022-03-29 RX ORDER — DEXAMETHASONE SODIUM PHOSPHATE 100 MG/10ML
10 INJECTION INTRAMUSCULAR; INTRAVENOUS
Status: COMPLETED | OUTPATIENT
Start: 2022-03-29 | End: 2022-03-29

## 2022-03-29 RX ORDER — ONDANSETRON 2 MG/ML
4 INJECTION INTRAMUSCULAR; INTRAVENOUS
Status: COMPLETED | OUTPATIENT
Start: 2022-03-29 | End: 2022-03-29

## 2022-03-29 RX ORDER — SODIUM CHLORIDE 0.9 % (FLUSH) 0.9 %
5-40 SYRINGE (ML) INJECTION EVERY 8 HOURS
Status: DISCONTINUED | OUTPATIENT
Start: 2022-03-29 | End: 2022-03-29 | Stop reason: HOSPADM

## 2022-03-29 RX ORDER — SODIUM CHLORIDE 0.9 % (FLUSH) 0.9 %
5-40 SYRINGE (ML) INJECTION AS NEEDED
Status: DISCONTINUED | OUTPATIENT
Start: 2022-03-29 | End: 2022-03-29 | Stop reason: HOSPADM

## 2022-03-29 RX ADMIN — ONDANSETRON 4 MG: 2 INJECTION INTRAMUSCULAR; INTRAVENOUS at 11:11

## 2022-03-29 RX ADMIN — DEXAMETHASONE SODIUM PHOSPHATE 10 MG: 10 INJECTION INTRAMUSCULAR; INTRAVENOUS at 12:25

## 2022-03-29 RX ADMIN — CYCLOBENZAPRINE 10 MG: 10 TABLET, FILM COATED ORAL at 12:25

## 2022-03-29 RX ADMIN — MORPHINE SULFATE 4 MG: 4 INJECTION INTRAVENOUS at 11:13

## 2022-03-29 NOTE — ED PROVIDER NOTES
Patient to ER complaining of slowly increasing back pain since this past Saturday i.e. 4 days ago. Patient had lumbar spine surgery done in January. She states she had nerve compression on both sides left greater than right, left side was operated on but now feels her right side is getting worse. She is seen by her orthopedist last Thursday, states she informed him of increased pain and she is now being scheduled for epidural steroid injection but not until April 5. She denies any bowel or bladder symptoms she was called in Robaxin yesterday but has not picked them up from the pharmacy. Mother states she did a lot of housework on Saturday and feel that made her pain worse. The history is provided by the patient. Back Pain  This is a new problem. The current episode started more than 2 days ago. The problem occurs constantly. The problem has been gradually worsening. Nothing aggravates the symptoms. Nothing relieves the symptoms. She has tried acetaminophen (Aleve) for the symptoms. The treatment provided mild relief.         Past Medical History:   Diagnosis Date    Anemia     iron supplements as a child    Anxiety 2011    was on celexa    Blighted ovum 11/22/2011    Hypothyroidism affecting pregnancy in first trimester 12/3/2020    Infertility, female     Psychiatric disorder     depression    Sciatica        Past Surgical History:   Procedure Laterality Date    APPENDECTOMY,W OTHR C      HX APPENDECTOMY           Family History:   Problem Relation Age of Onset    Diabetes Maternal Grandmother     Stroke Maternal Grandmother        Social History     Socioeconomic History    Marital status:      Spouse name: Not on file    Number of children: Not on file    Years of education: Not on file    Highest education level: Not on file   Occupational History    Not on file   Tobacco Use    Smoking status: Former Smoker     Packs/day: 0.25     Quit date: 9/22/2011     Years since quitting: 10.5    Smokeless tobacco: Never Used   Substance and Sexual Activity    Alcohol use: Not Currently     Comment: occ    Drug use: No    Sexual activity: Yes     Partners: Male     Birth control/protection: Pill   Other Topics Concern    Not on file   Social History Narrative    Not on file     Social Determinants of Health     Financial Resource Strain:     Difficulty of Paying Living Expenses: Not on file   Food Insecurity:     Worried About Running Out of Food in the Last Year: Not on file    Marcella of Food in the Last Year: Not on file   Transportation Needs:     Lack of Transportation (Medical): Not on file    Lack of Transportation (Non-Medical): Not on file   Physical Activity:     Days of Exercise per Week: Not on file    Minutes of Exercise per Session: Not on file   Stress:     Feeling of Stress : Not on file   Social Connections:     Frequency of Communication with Friends and Family: Not on file    Frequency of Social Gatherings with Friends and Family: Not on file    Attends Cheondoism Services: Not on file    Active Member of [address] [place] [address] or Organizations: Not on file    Attends Club or Organization Meetings: Not on file    Marital Status: Not on file   Intimate Partner Violence:     Fear of Current or Ex-Partner: Not on file    Emotionally Abused: Not on file    Physically Abused: Not on file    Sexually Abused: Not on file   Housing Stability:     Unable to Pay for Housing in the Last Year: Not on file    Number of [place] in the Last Year: Not on file    Unstable Housing in the Last Year: Not on file         ALLERGIES: Niacin    Review of Systems   Musculoskeletal: Positive for back pain. All other systems reviewed and are negative. Vitals:    03/29/22 1052   BP: 94/70   Pulse: 81   Resp: 16   Temp: 98.5 °F (36.9 °C)   SpO2: 99%   Weight: 76.2 kg (168 lb)   Height: 5' 5\" (1.651 m)            Physical Exam  Vitals and nursing note reviewed.    Constitutional: General: She is not in acute distress. Appearance: Normal appearance. She is well-developed and normal weight. She is not diaphoretic. HENT:      Head: Normocephalic and atraumatic. Right Ear: External ear normal.      Left Ear: External ear normal.      Nose: Nose normal.      Mouth/Throat:      Mouth: Mucous membranes are moist.   Eyes:      Pupils: Pupils are equal, round, and reactive to light. Cardiovascular:      Rate and Rhythm: Normal rate and regular rhythm. Pulmonary:      Effort: Pulmonary effort is normal.      Breath sounds: Normal breath sounds. Abdominal:      General: Abdomen is flat. Bowel sounds are normal.      Palpations: Abdomen is soft. Tenderness: There is no abdominal tenderness. Hernia: No hernia is present. Musculoskeletal:         General: Tenderness present. Normal range of motion. Cervical back: Normal range of motion and neck supple. Comments: Lumbar spine area shows well-healed incision site, she has diffuse tenderness in the rt posterior hip/sciatic notch area radiating down posterior rt leg. Right straight leg raise causes increased back pain. No numbness into the foot +5 strength to the lower extremity   Skin:     General: Skin is warm. Neurological:      General: No focal deficit present. Mental Status: She is alert and oriented to person, place, and time. Psychiatric:         Mood and Affect: Mood normal.         Behavior: Behavior normal.          MDM  Number of Diagnoses or Management Options  Sciatica of right side  Diagnosis management comments: L-spine x-rays are normal.  Patient feeling better after IV morphine Decadron and p.o.  Flexeril  Patient to call her orthopedist for routine appointment keep appointment next week for epidural injection return to ER if worsening symptoms  Rt sciatica        Amount and/or Complexity of Data Reviewed  Tests in the radiology section of CPT®: ordered and reviewed  Review and summarize past medical records: yes    Risk of Complications, Morbidity, and/or Mortality  Presenting problems: moderate  Diagnostic procedures: low  Management options: low    Patient Progress  Patient progress: improved         Procedures

## 2022-03-29 NOTE — ED NOTES
I have reviewed discharge instructions with the patient. The patient verbalized understanding. Patient left ED via Discharge Method: ambulatory to Home with parent. Opportunity for questions and clarification provided. Patient given 1 scripts. To continue your aftercare when you leave the hospital, you may receive an automated call from our care team to check in on how you are doing. This is a free service and part of our promise to provide the best care and service to meet your aftercare needs.  If you have questions, or wish to unsubscribe from this service please call [phone]. Thank you for Choosing our [address] [place] [address] Emergency Department.

## 2022-03-29 NOTE — ED NOTES
Pt woke up Severe bilateral back pain starting yesterday morning. Surgery on lumbar spine Jan 12, 2022. Too painful to move lower extremities. Bilateral LE sensation intact. No saddle anesthesia, bladder or bowel incontinence   TTP midline L5-S1 and bilateral SI joints R>L      Patient evaluated initially in triage. Rapid Medical Evaluation was conducted and necessary orders have been placed. I have performed a medical screening exam.  Care will now be transferred to the provider in the back of the emergency department.   DANIEL Zhou 10:51 AM

## 2022-03-29 NOTE — ED NOTES
Pt reports wok up with severe low back pain radiating to legs without numbness in BLE.  Pt reports had back surgery in January for sciatica  A&OX4

## 2022-03-29 NOTE — ED TRIAGE NOTES
Pt states she woke yesterday with pain to low back radiating into legs. Pt reports history of back surgery for sciatica in January of this year. Pt denies new injury.        Dao aYtes RN

## 2022-03-29 NOTE — DISCHARGE INSTRUCTIONS
Use Norco twice a day for severe pain along with current muscle relaxers, call your orthopedic office today to inform of visit and arrange routine follow-up

## 2022-04-08 ENCOUNTER — APPOINTMENT (RX ONLY)
Dept: URBAN - METROPOLITAN AREA CLINIC 329 | Facility: CLINIC | Age: 47
Setting detail: DERMATOLOGY
End: 2022-04-08

## 2022-04-08 DIAGNOSIS — L81.1 CHLOASMA: ICD-10-CM

## 2022-04-08 PROCEDURE — 99203 OFFICE O/P NEW LOW 30 MIN: CPT

## 2022-04-08 PROCEDURE — ? PRESCRIPTION

## 2022-04-08 PROCEDURE — ? RECOMMENDATIONS

## 2022-04-08 PROCEDURE — ? ADDITIONAL NOTES

## 2022-04-08 PROCEDURE — ? COUNSELING

## 2022-04-08 RX ORDER — FLUOCINOLONE ACETONIDE, HYDROQUINONE, AND TRETINOIN .1; 40; .5 MG/G; MG/G; MG/G
CREAM TOPICAL QHS
Qty: 30 | Refills: 3 | Status: ERX | COMMUNITY
Start: 2022-04-08

## 2022-04-08 RX ADMIN — FLUOCINOLONE ACETONIDE, HYDROQUINONE, AND TRETINOIN: .1; 40; .5 CREAM TOPICAL at 00:00

## 2022-04-08 ASSESSMENT — LOCATION SIMPLE DESCRIPTION DERM
LOCATION SIMPLE: LEFT CHEEK
LOCATION SIMPLE: RIGHT CHEEK

## 2022-04-08 ASSESSMENT — LOCATION ZONE DERM: LOCATION ZONE: FACE

## 2022-04-08 ASSESSMENT — LOCATION DETAILED DESCRIPTION DERM
LOCATION DETAILED: RIGHT SUPERIOR CENTRAL MALAR CHEEK
LOCATION DETAILED: LEFT SUPERIOR MEDIAL MALAR CHEEK

## 2023-02-22 PROBLEM — O44.13 PLACENTA PREVIA WITH HEMORRHAGE IN THIRD TRIMESTER: Status: RESOLVED | Noted: 2021-02-14 | Resolved: 2023-02-22

## 2023-02-27 ENCOUNTER — ROUTINE PRENATAL (OUTPATIENT)
Dept: OBGYN CLINIC | Age: 48
End: 2023-02-27
Payer: OTHER GOVERNMENT

## 2023-02-27 VITALS — DIASTOLIC BLOOD PRESSURE: 64 MMHG | SYSTOLIC BLOOD PRESSURE: 100 MMHG

## 2023-02-27 DIAGNOSIS — O34.29 UTERINE SCAR FROM PREVIOUS SURGERY AFFECTING PREGNANCY: Primary | ICD-10-CM

## 2023-02-27 DIAGNOSIS — O09.812 PREGNANCY RESULTING FROM IN VITRO FERTILIZATION IN SECOND TRIMESTER: ICD-10-CM

## 2023-02-27 DIAGNOSIS — O09.812 HIGH RISK PREGNANCY DUE TO ASSISTED REPRODUCTIVE TECHNOLOGY IN SECOND TRIMESTER: ICD-10-CM

## 2023-02-27 DIAGNOSIS — O99.342 ANXIETY DURING PREGNANCY IN SECOND TRIMESTER, ANTEPARTUM: ICD-10-CM

## 2023-02-27 DIAGNOSIS — Z87.59 HISTORY OF PLACENTA PREVIA: ICD-10-CM

## 2023-02-27 DIAGNOSIS — E03.9 HYPOTHYROIDISM AFFECTING PREGNANCY IN SECOND TRIMESTER: ICD-10-CM

## 2023-02-27 DIAGNOSIS — O99.012 ANEMIA AFFECTING PREGNANCY IN SECOND TRIMESTER: ICD-10-CM

## 2023-02-27 DIAGNOSIS — O99.282 HYPOTHYROIDISM AFFECTING PREGNANCY IN SECOND TRIMESTER: ICD-10-CM

## 2023-02-27 DIAGNOSIS — N96 RECURRENT PREGNANCY LOSS WITHOUT CURRENT PREGNANCY: ICD-10-CM

## 2023-02-27 DIAGNOSIS — F41.9 ANXIETY DURING PREGNANCY IN SECOND TRIMESTER, ANTEPARTUM: ICD-10-CM

## 2023-02-27 DIAGNOSIS — Z86.59 HISTORY OF DEPRESSION: ICD-10-CM

## 2023-02-27 DIAGNOSIS — Z13.32 ENCOUNTER FOR SCREENING FOR MATERNAL DEPRESSION: ICD-10-CM

## 2023-02-27 DIAGNOSIS — O09.522 HIGH RISK PREGNANCY, MULTIGRAVIDA OF ADVANCED MATERNAL AGE IN SECOND TRIMESTER: ICD-10-CM

## 2023-02-27 DIAGNOSIS — Z3A.22 22 WEEKS GESTATION OF PREGNANCY: ICD-10-CM

## 2023-02-27 DIAGNOSIS — O09.292 CURRENT PREGNANCY IN SECOND TRIMESTER WITH HISTORY OF PLACENTA PREVIA DURING PRIOR PREGNANCY: ICD-10-CM

## 2023-02-27 PROBLEM — F32.A DEPRESSION AFFECTING PREGNANCY IN SECOND TRIMESTER, ANTEPARTUM: Status: ACTIVE | Noted: 2021-01-21

## 2023-02-27 PROBLEM — O46.93 VAGINAL BLEEDING IN PREGNANCY, THIRD TRIMESTER: Status: RESOLVED | Noted: 2021-02-14 | Resolved: 2023-02-27

## 2023-02-27 PROBLEM — O44.02 PLACENTA PREVIA IN SECOND TRIMESTER: Status: RESOLVED | Noted: 2020-12-07 | Resolved: 2023-02-27

## 2023-02-27 PROBLEM — O99.019 ANEMIA AFFECTING FIRST PREGNANCY: Status: ACTIVE | Noted: 2021-02-23

## 2023-02-27 PROCEDURE — 76811 OB US DETAILED SNGL FETUS: CPT | Performed by: OBSTETRICS & GYNECOLOGY

## 2023-02-27 PROCEDURE — 99205 OFFICE O/P NEW HI 60 MIN: CPT | Performed by: OBSTETRICS & GYNECOLOGY

## 2023-02-27 PROCEDURE — 93325 DOPPLER ECHO COLOR FLOW MAPG: CPT | Performed by: OBSTETRICS & GYNECOLOGY

## 2023-02-27 PROCEDURE — 96127 BRIEF EMOTIONAL/BEHAV ASSMT: CPT | Performed by: OBSTETRICS & GYNECOLOGY

## 2023-02-27 PROCEDURE — 76825 ECHO EXAM OF FETAL HEART: CPT | Performed by: OBSTETRICS & GYNECOLOGY

## 2023-02-27 RX ORDER — LEVOTHYROXINE SODIUM 0.1 MG/1
100 TABLET ORAL
Qty: 30 TABLET | Refills: 3 | Status: SHIPPED | OUTPATIENT
Start: 2023-02-27

## 2023-02-27 RX ORDER — SERTRALINE HYDROCHLORIDE 100 MG/1
150 TABLET, FILM COATED ORAL DAILY
Qty: 45 TABLET | Refills: 4 | Status: SHIPPED | OUTPATIENT
Start: 2023-02-27

## 2023-02-27 ASSESSMENT — PATIENT HEALTH QUESTIONNAIRE - PHQ9
SUM OF ALL RESPONSES TO PHQ QUESTIONS 1-9: 3
9. THOUGHTS THAT YOU WOULD BE BETTER OFF DEAD, OR OF HURTING YOURSELF: 0
SUM OF ALL RESPONSES TO PHQ QUESTIONS 1-9: 3
1. LITTLE INTEREST OR PLEASURE IN DOING THINGS: 1
2. FEELING DOWN, DEPRESSED OR HOPELESS: 2
SUM OF ALL RESPONSES TO PHQ9 QUESTIONS 1 & 2: 3

## 2023-02-27 NOTE — ASSESSMENT & PLAN NOTE
Anxiety and Depression  The approach to depression and anxiety in pregnancy must look at the whole maternal-child cohort to assess risks and benefits.  depression is associated with an increased risk of multiple poor obstetrical outcomes, including spontaneous , bleeding, operative deliveries, and  birth. In a nationally representative survey in the [place] that identified pregnant women with major depression, only 50 percent received treatment. Untreated disease causes maternal suffering and is associated with poor nutrition, comorbid substance use disorders, poor adherence with prenatal care, postpartum depression, impaired relationships between the mother and her infant and other family members, and an increased risk of suicide. It is important to assess the benefit of previous treatment in order to guide treatment selection.  If psychotherapy is indicated and the patient was successfully treated with a particular psychotherapy prior to pregnancy, the same therapy is used during pregnancy.  Similarly, if pharmacotherapy is indicated and the patient was successfully treated with a particular antidepressant prior to pregnancy, the same drug is used during pregnancy.  The risks of untreated moderate to severe maternal major depression, to both the mother and fetus, often outweigh the risks associated with antidepressants. A national registry study (nearly 1,300,000 births) compared infants who were exposed to SSRIs in early pregnancy (n >10,000) with infants not exposed. After adjusting for potential confounds (eg, maternal age, smoking, and body mass index), the analyses found that the risk of severe congenital malformations was comparable in the two groups. Antidepressant drug doses may need to be increased as the pregnancy progresses, especially during the third trimester.  There is no evidence that tapering or discontinuing antidepressants at term reduces the risk of  complications, and tapering or stopping antidepressants can increase the maternal risk of  relapse. It is generally agreed the risks of  depression (especially recurrent episodes) exceed the risks of  complications. It is important to continue to monitor mood in the  period, as more than 20% women will struggle with depression or other mood issues in pregnancy/postpartum. Those with a history will be at a much higher risk for exacerbation with the hormonal fluctuations of this period. Mood evaluated today; PHQ2 reviewed. Counseling re possibility of peripartum worsening. Mood Reassuring today  Recommend lifestyle/behavioral modifications to enhance mood (exercise, sunshine, mood apps, nutritional modifications, etc). As mood has worsened and depression/anxiety are not well-controlled at this time, will adjust medications today.

## 2023-02-27 NOTE — ASSESSMENT & PLAN NOTE
F/U MFM 6-8 weeks repeat echo    Advanced Maternal Age (Maternal Age 28 or greater at CRISTAL)  First and Second Trimester  The risk of fetal aneuploidy based on maternal age should be reviewed with patient either with physicians or genetic counselor, or both. Testing for fetal aneuploidy can be divided into invasive and non-invasive tests.  Invasive testing, which includes amniocentesis and chorionic villus sampling, is diagnostic.  Non-invasive testing of maternal blood (for either hormone levels or cell-free fetal DNA), with or without ultrasound examination, is a screening test and requires follow-up testing of patients with screen-positive results. Given the increased risk of congenital anomalies in older women, a detailed second trimester ultrasound examination to assess for significant structural anomalies (particularly cardiac defects) is recommended. Other events that occur with increased frequency with increasing maternal age include hypertensive disease. For this reason, I recommend daily 162mg ASA for early/severe preeclampsia prevention. Third Trimester  There are no large randomized trials that have examined the efficacy of routine antepartum testing in women age 28 and older, therefore there is no consensus on the management of late pregnancy for this population. One strategy is to stratify women based on their risk factors, such as age and parity, and to consider other factors that might influence risk, such as pregestational obesity and race.  The risk of stillbirth increases with increasing maternal age such that women ? 36years of age have the same risk of stillbirth at 43 weeks of gestation as women in their mid-20s have at 36 weeks of gestation. For this reason, we recommend beginning testing at 37 weeks in those 36years of age and older due to stillbirth risk.      Consider induction at 44 weeks of gestation for women who are ?28 years and primiparous, ?44years of age, of black race, or who have additional risk factors for stillbirth such as obesity. Women who decline induction should undergo  testing and daily kick counts until spontaneous labor, nonreassuring testing, or 41 weeks of gestation. Genetic counseling was performed by physician after reviewing patient's genetic history. The patient's Down syndrome age associated risk, as well as, risks of additional aneuploidy and genetic syndromes, are reduced by approximately 50% with a normal anatomy ultrasound. Ultrasound alone does not rule out all abnormalities of genetics and development. Maternal serum screening for aneuploidy was discussed with the patient including first trimester EVELYN-A/hCG, second trimester Quad screen (either in isolation or sequential with EVELYN-A) as well as non-invasive prenatal testing (NIPT) for aneuploidy from a maternal blood sample. Positive predictive and negative predictive values for these tests were explained, questions answered. Patient understands that these are screening tests that only assesses risk for select abnormalities (trisomies 15, 25, and 21, and sex chromosome abnormalities (NIPT), as well as markers for placental health (EVELYN-A) and risk for open neural tube defects (quad)). NIPT is designed for high risk populations, but should be considered by all patients who desire the current best option for screening for applicable genetic abnormalities. Limitations of technology discussed based on maternal age, technical aspects of tests, and maternal BMI reviewed. All questions answered and concerns discussed. Patient elected to proceed with NIPT previously at [facility] office- results low risk.

## 2023-02-27 NOTE — ASSESSMENT & PLAN NOTE
Patient has history of hypothyroidism, currently well-controlled on synthroid. The most common etiologies of hypothyroidism in pregnant or postpartum women are chronic autoimmune thyroiditis (Hashimotos disease), subacute thyroiditis, thyroidectomy, radioactive iodine treatment, and iodine deficiency. In developed countries, Hashimoto`s disease is the most common etiology and is characterized by the production of antithyroid antibodies. Worldwide, the most common cause of hypothyroidism is iodine deficiency. The symptoms of hypothyroidism are fatigue, constipation, intolerance to cold, muscle cramps, hair loss, dry skin, prolonged relaxation phase of deep tendon reflexes, and carpal tunnel syndrome. If left untreated, hypothyroidism will progress to myxedema coma, psychosis, and in some cases overt psychosis. Subclinical hypothyroidism is defined as an elevated TSH with normal free thyroxine index (FTI) or normal free T4 level in an asymptomatic pregnant woman. Untreated hypothyroidism is associated with an increased risk of preeclampsia. Untreated or inadequately treated hypothyroidism is associated with an increased incidence of preeclampsia and low birth weight. Women with iodine-deficient hypothyroidism are at a significant risk of having babies with congenital cretinism. Often, thyroid replacement dose will need to be increased over the course of pregnancy. · Recommend evaluation of TSH each trimester. Goal to keep TSH <2-2.5. · If medication change, repeat TFTs in 4 weeks. · If poorly controlled thyroid, recommend growth evaluation in 3rd trimester.

## 2023-02-27 NOTE — PATIENT INSTRUCTIONS
Resources for Depression/Anxiety  Postpartum Support International (PSI). PSI Warmline:  3-403-591-4PPD (2006). WWW. POSTPARTUM. NET    Mom's IMPACTT  [email or web address]

## 2023-02-27 NOTE — PROGRESS NOTES
MFM Consultation    Alverto Plunkett (: 1975) is a 41 y.o. [id number] at 22w1d with 2023, by Other [employer]. 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 Scheduled to see primary OB (Stefani) on 3/15/23. Spouse present today. No HAs, edema. Denies preeclamptic symptoms. Reports good fetal movement. No bleeding, LOF, cramping, ctxs, or vaginal pressure. Mood has worsened from baseline. History reviewed and updated as needed. Review of Systems - per HPI; otherwise unremarkable. Exam:     Vitals:    23 1501   BP: 100/64     Recent Mood:  Mood concerns: that she has no suicidal/homicidal inclination and feels irritable. Recent Labs Reviewed. Please see formal ultrasound report under imaging tab. ASSESSMENT/PLAN:  Patient Active Problem List    Diagnosis Date Noted    High risk pregnancy, multigravida of advanced maternal age in second trimester 2020     Priority: High     Overview Note:     donor egg 31 yrs old,  sperm  2023 at [facility]: Normal anatomy limited heart views, AC 83%, BOO WNL             Assessment & Plan Note:     F/U MFM 6-8 weeks repeat echo    Advanced Maternal Age (Maternal Age 28 or greater at [facility])  First and Second Trimester  The risk of fetal aneuploidy based on maternal age should be reviewed with patient either with physicians or genetic counselor, or both. Testing for fetal aneuploidy can be divided into invasive and non-invasive tests. Invasive testing, which includes amniocentesis and chorionic villus sampling, is diagnostic. Non-invasive testing of maternal blood (for either hormone levels or cell-free fetal DNA), with or without ultrasound examination, is a screening test and requires follow-up testing of patients with screen-positive results.   Given the increased risk of congenital anomalies in older women, a detailed second trimester ultrasound examination to assess for significant structural anomalies (particularly cardiac defects) is recommended. Other events that occur with increased frequency with increasing maternal age include hypertensive disease. For this reason, I recommend daily 162mg ASA for early/severe preeclampsia prevention. Third Trimester  There are no large randomized trials that have examined the efficacy of routine antepartum testing in women age 28 and older, therefore there is no consensus on the management of late pregnancy for this population. One strategy is to stratify women based on their risk factors, such as age and parity, and to consider other factors that might influence risk, such as pregestational obesity and race. The risk of stillbirth increases with increasing maternal age such that women ? 36years of age have the same risk of stillbirth at 43 weeks of gestation as women in their mid-20s have at 36 weeks of gestation. For this reason, we recommend beginning testing at 37 weeks in those 36years of age and older due to stillbirth risk. Consider induction at 44 weeks of gestation for women who are ?28 years and primiparous, ?44years of age, of black race, or who have additional risk factors for stillbirth such as obesity. Women who decline induction should undergo  testing and daily kick counts until spontaneous labor, nonreassuring testing, or 41 weeks of gestation. Genetic counseling was performed by physician after reviewing patient's genetic history. The patient's Down syndrome age associated risk, as well as, risks of additional aneuploidy and genetic syndromes, are reduced by approximately 50% with a normal anatomy ultrasound. Ultrasound alone does not rule out all abnormalities of genetics and development.      Maternal serum screening for aneuploidy was discussed with the patient including first trimester EVELYN-A/hCG, second trimester Quad screen (either in isolation or sequential with EVELYN-A) as well as non-invasive prenatal testing (NIPT) for aneuploidy from a maternal blood sample. Positive predictive and negative predictive values for these tests were explained, questions answered. Patient understands that these are screening tests that only assesses risk for select abnormalities (trisomies 15, 25, and 21, and sex chromosome abnormalities (NIPT), as well as markers for placental health (EVELYN-A) and risk for open neural tube defects (quad)). NIPT is designed for high risk populations, but should be considered by all patients who desire the current best option for screening for applicable genetic abnormalities. Limitations of technology discussed based on maternal age, technical aspects of tests, and maternal BMI reviewed. All questions answered and concerns discussed. Patient elected to proceed with NIPT previously at [facility] office- results low risk. Pregnancy resulting from in vitro fertilization in second trimester 2023     Priority: Medium     Overview Note:     Donor egg (31yo); spouse sperm       Assessment & Plan Note:      IVF/ICSI    Pregnancies as a result of IVF/ICSI are at increased risk of abnormal placentation, preeclampsia, risk of birth defects (including major) and heart defects. It is unclear how much the risks of birth defects are due to underlying parental factors as opposed to IVF technology itself. However, level 2 anatomy evaluation and fetal echo are recommended. There are increased rates of lower birth weight and  delivery. Genetic imprinting disorders associated with IVF/ICSI include Mikel-Wiedemann, Silver-Amos syndrome, Angelman syndrome, and Prader-Willi syndrome. These are likely due to methylation dynamics during embryologic development. There is a possible increase in childhood cancer associated in children resulting from IVF/ICSI pregnancies. Pregnancies as a result of frozen embryo transfer are at an increased risk for preeclampsia, postpartum hemorrhage, and abnormal placentation. This is thought to be secondary to differences in the development of maternal-fetal interface and/or absence of vasoactive substances typically released from corpus luteum. Pregnancies achieved with oocyte/embryo donation have more than double the risk of preeclampsia when compared with other methods of ART and a more than fourfold increased risk compared with natural conception, likely secondary, to immunologic naivety. Additionally, these pregnancies have increased risks for  delivery and  delivery compared to other ART pregnancies. Maternal complications including pp hemorrhage, ICU admission, and sepsis are increased risk with IVF/ICSI. There is up to an four-fold increase in risk of stillbirth/ mortality with IVF/ICSI as opposed to other ART pregnancies. Anemia affecting pregnancy in second trimester 2021     Priority: Medium     Overview Note:     Hx Hb on 21 is 7.5 Started IV iron  23 Hgb 8.0  23 [facility]: Pt has been taking po Fe BID. Hx of iron infusions. Referral to Hematology done today             Assessment & Plan Note:            Anxiety during pregnancy in second trimester, antepartum 2021     Priority: Medium     Overview Note:     23 [facility]: Takes Zoloft 100mg po daily. Will increase to 150mg po daily as patient reports feeling increased irritability. Assessment & Plan Note:      Anxiety and Depression  The approach to depression and anxiety in pregnancy must look at the whole maternal-child cohort to assess risks and benefits.  depression is associated with an increased risk of multiple poor obstetrical outcomes, including spontaneous , bleeding, operative deliveries, and  birth.  In a nationally representative survey in the [place] that identified pregnant women with major depression, only 50 percent received treatment. Untreated disease causes maternal suffering and is associated with poor nutrition, comorbid substance use disorders, poor adherence with prenatal care, postpartum depression, impaired relationships between the mother and her infant and other family members, and an increased risk of suicide. It is important to assess the benefit of previous treatment in order to guide treatment selection. If psychotherapy is indicated and the patient was successfully treated with a particular psychotherapy prior to pregnancy, the same therapy is used during pregnancy. Similarly, if pharmacotherapy is indicated and the patient was successfully treated with a particular antidepressant prior to pregnancy, the same drug is used during pregnancy. The risks of untreated moderate to severe maternal major depression, to both the mother and fetus, often outweigh the risks associated with antidepressants. A national registry study (nearly 1,300,000 births) compared infants who were exposed to SSRIs in early pregnancy (n >10,000) with infants not exposed. After adjusting for potential confounds (eg, maternal age, smoking, and body mass index), the analyses found that the risk of severe congenital malformations was comparable in the two groups. Antidepressant drug doses may need to be increased as the pregnancy progresses, especially during the third trimester. There is no evidence that tapering or discontinuing antidepressants at term reduces the risk of  complications, and tapering or stopping antidepressants can increase the maternal risk of  relapse. It is generally agreed the risks of  depression (especially recurrent episodes) exceed the risks of  complications.      It is important to continue to monitor mood in the  period, as more than 20% women will struggle with depression or other mood issues in pregnancy/postpartum. Those with a history will be at a much higher risk for exacerbation with the hormonal fluctuations of this period. Mood evaluated today; PHQ2 reviewed. Counseling re possibility of peripartum worsening. Mood Reassuring today  Recommend lifestyle/behavioral modifications to enhance mood (exercise, sunshine, mood apps, nutritional modifications, etc). As mood has worsened and depression/anxiety are not well-controlled at this time, will adjust medications today. Hypothyroidism affecting pregnancy in second trimester 12/03/2020     Priority: Medium     Overview Note:     02/27/23 [facility]: Takes synthroid 100 mcg po daily    Recommend evaluation of TSH each trimester. Goal to keep TSH <2-2.5. If medication change, repeat TFTs in 4 weeks. If poorly controlled thyroid, recommend growth evaluation in 3rd trimester. Assessment & Plan Note:     Patient has history of hypothyroidism, currently well-controlled on synthroid. The most common etiologies of hypothyroidism in pregnant or postpartum women are chronic autoimmune thyroiditis (Hashimotos disease), subacute thyroiditis, thyroidectomy, radioactive iodine treatment, and iodine deficiency. In developed countries, Hashimoto`s disease is the most common etiology and is characterized by the production of antithyroid antibodies. Worldwide, the most common cause of hypothyroidism is iodine deficiency. The symptoms of hypothyroidism are fatigue, constipation, intolerance to cold, muscle cramps, hair loss, dry skin, prolonged relaxation phase of deep tendon reflexes, and carpal tunnel syndrome. If left untreated, hypothyroidism will progress to myxedema coma, psychosis, and in some cases overt psychosis. Subclinical hypothyroidism is defined as an elevated TSH with normal free thyroxine index (FTI) or normal free T4 level in an asymptomatic pregnant woman.  Untreated hypothyroidism is associated with an increased risk of preeclampsia. Untreated or inadequately treated hypothyroidism is associated with an increased incidence of preeclampsia and low birth weight. Women with iodine-deficient hypothyroidism are at a significant risk of having babies with congenital cretinism. Often, thyroid replacement dose will need to be increased over the course of pregnancy. Recommend evaluation of TSH each trimester. Goal to keep TSH <2-2.5. If medication change, repeat TFTs in 4 weeks. If poorly controlled thyroid, recommend growth evaluation in 3rd trimester. Uterine scar from previous surgery affecting pregnancy 2022     Priority: Low    Current pregnancy in second trimester with history of placenta previa during prior pregnancy 2021     Priority: Low     Overview Note:      delivery due to previa with bleeding; daughter in NICU x 5.5wk    Posterior placenta well aware from cervix this pregnancy      History of depression 2021     Priority: Low       Mood evaluated today; PHQ2 reviewed. Counseling re possibility of peripartum worsening. Recommend lifestyle/behavioral modifications to enhance mood (exercise, sunshine, mood apps, nutritional modifications, etc). As mood has worsened and depression/anxiety are not well-controlled at this time, will adjust medications today. Addressed normal pregnancy complaints, reassured and offered suggestions for care  Reviewed gestational age precautions and activity goals/limitations  Nutritional counseling as well as specific goals based on current maternal and fetal status  Options for GERD therapy if becomes symptomatic over course of pregnancy  Gestational age appropriate preventive care regarding communicable disease transmission and vaccines as appropriate (including flu, TDaP, and COVID.)  Additional plans and concerns as documented in problem list.   All questions answered and concerns discussed.       An electronic signature was used to authenticate this note. Jc Shi MD    I have spent 64 minutes reviewing previous notes, test results and face to face with the patient discussing the diagnosis and importance of compliance with the treatment plan, in addition to ultrasound findings, as well as documenting on the day of the visit (02/27/2023). Return in about 7 weeks (around 4/18/2023) for Growth .     Patient Active Problem List   Diagnosis Code    Current pregnancy in second trimester with history of placenta previa during prior pregnancy O09.292    Hypothyroidism affecting pregnancy in second trimester O99.282, E03.9    History of depression Z86.59    High risk pregnancy, multigravida of advanced maternal age in second trimester O09.522    Anemia affecting pregnancy in second trimester O99.012    Anxiety during pregnancy in second trimester, antepartum O99.342, F41.9    Uterine scar from previous surgery affecting pregnancy O34.29    Pregnancy resulting from in vitro fertilization in second trimester O09.812     Visit Diagnoses         Codes    22 weeks gestation of pregnancy     Z3A.22    Encounter for screening for maternal depression     Z13.32

## 2023-02-27 NOTE — ASSESSMENT & PLAN NOTE
IVF/ICSI    Pregnancies as a result of IVF/ICSI are at increased risk of abnormal placentation, preeclampsia, risk of birth defects (including major) and heart defects. It is unclear how much the risks of birth defects are due to underlying parental factors as opposed to IVF technology itself. However, level 2 anatomy evaluation and fetal echo are recommended. There are increased rates of lower birth weight and  delivery. Genetic imprinting disorders associated with IVF/ICSI include Mikel-Wiedemann, Silver-Amos syndrome, Angelman syndrome, and Prader-Willi syndrome. These are likely due to methylation dynamics during embryologic development. There is a possible increase in childhood cancer associated in children resulting from IVF/ICSI pregnancies. Pregnancies as a result of frozen embryo transfer are at an increased risk for preeclampsia, postpartum hemorrhage, and abnormal placentation. This is thought to be secondary to differences in the development of maternal-fetal interface and/or absence of vasoactive substances typically released from corpus luteum. Pregnancies achieved with oocyte/embryo donation have more than double the risk of preeclampsia when compared with other methods of ART and a more than fourfold increased risk compared with natural conception, likely secondary, to immunologic naivety. Additionally, these pregnancies have increased risks for  delivery and  delivery compared to other ART pregnancies. Maternal complications including pp hemorrhage, ICU admission, and sepsis are increased risk with IVF/ICSI. There is up to an four-fold increase in risk of stillbirth/ mortality with IVF/ICSI as opposed to other ART pregnancies.
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
## 2023-04-05 NOTE — PROGRESS NOTES
New Patient Abstract    Reason for Referral: Anemia affecting pregnancy in second trimester; Iron deficiency anemia, unspecified; High risk pregnancy due to assisted reproductive technology in second trimester; Advanced maternal age in multigravida, second trimester                                                                                                                                                                                                                                                                                                                                                                                                                                                                                                                                                                                                                                                                                                                                                                                                                                                                                                                                                                                                                                                                                                                                                                                                                                                                                                                          Referring Provider: Mignon Peña MD    Primary Care Provider: Tab Perez MD    Family History of Cancer/Hematologic Disorders: None documented. Presenting Symptoms: No relevant physical symptoms reported.      Narrative with recent with Results/Procedures/Biopsies and Dates completed: Ms. Angelica Stout is a 79-year-old, [id number],  female with a history of tobacco use (former smoker; quit in

## 2023-04-06 ENCOUNTER — HOSPITAL ENCOUNTER (OUTPATIENT)
Dept: LAB | Age: 48
Discharge: HOME | End: 2023-04-06
Payer: OTHER GOVERNMENT

## 2023-04-06 ENCOUNTER — OFFICE VISIT (OUTPATIENT)
Dept: ONCOLOGY | Age: 48
End: 2023-04-06
Payer: OTHER GOVERNMENT

## 2023-04-06 VITALS
OXYGEN SATURATION: 99 % | TEMPERATURE: 98.1 F | RESPIRATION RATE: 18 BRPM | HEIGHT: 64 IN | BODY MASS INDEX: 31.09 KG/M2 | HEART RATE: 85 BPM | DIASTOLIC BLOOD PRESSURE: 63 MMHG | WEIGHT: 182.1 LBS | SYSTOLIC BLOOD PRESSURE: 100 MMHG

## 2023-04-06 DIAGNOSIS — O99.012 ANEMIA AFFECTING PREGNANCY IN SECOND TRIMESTER: Primary | ICD-10-CM

## 2023-04-06 DIAGNOSIS — D50.8 IRON DEFICIENCY ANEMIA SECONDARY TO INADEQUATE DIETARY IRON INTAKE: ICD-10-CM

## 2023-04-06 DIAGNOSIS — O99.012 ANEMIA AFFECTING PREGNANCY IN SECOND TRIMESTER: ICD-10-CM

## 2023-04-06 LAB
ABO + RH BLD: NORMAL
BASOPHILS # BLD: 0 K/UL (ref 0–0.2)
BASOPHILS NFR BLD: 0 % (ref 0–2)
BLOOD GROUP ANTIBODIES SERPL: NORMAL
DIFFERENTIAL METHOD BLD: ABNORMAL
EOSINOPHIL # BLD: 0 K/UL (ref 0–0.8)
EOSINOPHIL NFR BLD: 0 % (ref 0.5–7.8)
ERYTHROCYTE [DISTWIDTH] IN BLOOD BY AUTOMATED COUNT: 15.6 % (ref 11.9–14.6)
FERRITIN SERPL-MCNC: 5 NG/ML (ref 8–388)
FOLATE SERPL-MCNC: 27.2 NG/ML (ref 3.1–17.5)
HCT VFR BLD AUTO: 30.4 % (ref 35.8–46.3)
HGB BLD-MCNC: 9.8 G/DL (ref 11.7–15.4)
IMM GRANULOCYTES # BLD AUTO: 0.1 K/UL (ref 0–0.5)
IMM GRANULOCYTES NFR BLD AUTO: 1 % (ref 0–5)
IRON SATN MFR SERPL: 6 %
IRON SERPL-MCNC: 36 UG/DL (ref 35–150)
LYMPHOCYTES # BLD: 2.2 K/UL (ref 0.5–4.6)
LYMPHOCYTES NFR BLD: 25 % (ref 13–44)
MCH RBC QN AUTO: 25.7 PG (ref 26.1–32.9)
MCHC RBC AUTO-ENTMCNC: 32.2 G/DL (ref 31.4–35)
MCV RBC AUTO: 79.6 FL (ref 82–102)
MONOCYTES # BLD: 0.6 K/UL (ref 0.1–1.3)
MONOCYTES NFR BLD: 7 % (ref 4–12)
NEUTS SEG # BLD: 6.1 K/UL (ref 1.7–8.2)
NEUTS SEG NFR BLD: 67 % (ref 43–78)
NRBC # BLD: 0 K/UL (ref 0–0.2)
PLATELET # BLD AUTO: 426 K/UL (ref 150–450)
PMV BLD AUTO: 9.7 FL (ref 9.4–12.3)
RBC # BLD AUTO: 3.82 M/UL (ref 4.05–5.2)
SPECIMEN EXP DATE BLD: NORMAL
TIBC SERPL-MCNC: 572 UG/DL (ref 250–450)
VIT B12 SERPL-MCNC: 372 PG/ML (ref 193–986)
WBC # BLD AUTO: 9 K/UL (ref 4.3–11.1)

## 2023-04-06 PROCEDURE — 84630 ASSAY OF ZINC: CPT

## 2023-04-06 PROCEDURE — 82728 ASSAY OF FERRITIN: CPT

## 2023-04-06 PROCEDURE — 82746 ASSAY OF FOLIC ACID SERUM: CPT

## 2023-04-06 PROCEDURE — 36415 COLL VENOUS BLD VENIPUNCTURE: CPT

## 2023-04-06 PROCEDURE — 86900 BLOOD TYPING SEROLOGIC ABO: CPT

## 2023-04-06 PROCEDURE — 99204 OFFICE O/P NEW MOD 45 MIN: CPT | Performed by: INTERNAL MEDICINE

## 2023-04-06 PROCEDURE — 85025 COMPLETE CBC W/AUTO DIFF WBC: CPT

## 2023-04-06 PROCEDURE — 83540 ASSAY OF IRON: CPT

## 2023-04-06 PROCEDURE — 82525 ASSAY OF COPPER: CPT

## 2023-04-06 PROCEDURE — 82607 VITAMIN B-12: CPT

## 2023-04-06 RX ORDER — SODIUM CHLORIDE 9 MG/ML
INJECTION, SOLUTION INTRAVENOUS CONTINUOUS
OUTPATIENT
Start: 2023-04-10

## 2023-04-06 RX ORDER — FAMOTIDINE 10 MG/ML
20 INJECTION, SOLUTION INTRAVENOUS
OUTPATIENT
Start: 2023-04-10

## 2023-04-06 RX ORDER — SODIUM CHLORIDE 9 MG/ML
5-250 INJECTION, SOLUTION INTRAVENOUS PRN
OUTPATIENT
Start: 2023-04-10

## 2023-04-06 RX ORDER — HEPARIN SODIUM (PORCINE) LOCK FLUSH IV SOLN 100 UNIT/ML 100 UNIT/ML
500 SOLUTION INTRAVENOUS PRN
OUTPATIENT
Start: 2023-04-10

## 2023-04-06 RX ORDER — EPINEPHRINE 1 MG/ML
0.3 INJECTION, SOLUTION, CONCENTRATE INTRAVENOUS PRN
OUTPATIENT
Start: 2023-04-10

## 2023-04-06 RX ORDER — DIPHENHYDRAMINE HYDROCHLORIDE 50 MG/ML
50 INJECTION INTRAMUSCULAR; INTRAVENOUS
OUTPATIENT
Start: 2023-04-10

## 2023-04-06 RX ORDER — ALBUTEROL SULFATE 90 UG/1
4 AEROSOL, METERED RESPIRATORY (INHALATION) PRN
OUTPATIENT
Start: 2023-04-10

## 2023-04-06 RX ORDER — ONDANSETRON 2 MG/ML
8 INJECTION INTRAMUSCULAR; INTRAVENOUS
OUTPATIENT
Start: 2023-04-10

## 2023-04-06 RX ORDER — SODIUM CHLORIDE 0.9 % (FLUSH) 0.9 %
5-40 SYRINGE (ML) INJECTION PRN
OUTPATIENT
Start: 2023-04-10

## 2023-04-06 RX ORDER — ACETAMINOPHEN 325 MG/1
650 TABLET ORAL
OUTPATIENT
Start: 2023-04-10

## 2023-04-06 ASSESSMENT — ENCOUNTER SYMPTOMS
WHEEZING: 0
HEMOPTYSIS: 0
CHEST TIGHTNESS: 0
VOMITING: 0
ABDOMINAL DISTENTION: 0
DIARRHEA: 0
SCLERAL ICTERUS: 0
TROUBLE SWALLOWING: 0
SORE THROAT: 0
NAUSEA: 0
BLOOD IN STOOL: 0
ABDOMINAL PAIN: 0
CONSTIPATION: 0
VOICE CHANGE: 0
SHORTNESS OF BREATH: 1

## 2023-04-06 NOTE — PATIENT INSTRUCTIONS
Patient Instructions from Today's Visit    Reason for Visit:  New Patient    Plan:  Symptoms reviewed. We will do bloodwork today. We will reach out to you with any abnormal results and recommendations. Follow Up: Injectafer IV iron infusion appointment next week pending approval    Recent Lab Results:  No visits with results within 1 Day(s) from this visit. Latest known visit with results is:   No results found for any previous visit. Treatment Summary has been discussed and given to patient: n/a        -------------------------------------------------------------------------------------------------------------------  Please call our office at [phone] if you have any  of the following symptoms:   Fever of 100.5 or greater  Chills  Shortness of breath  Swelling or pain in one leg    After office hours an answering service is available and will contact a provider for emergencies or if you are experiencing any of the above symptoms. Patient did express an interest in My Chart. My Chart log in information explained on the after visit summary printout at the [facility] desk.     Abel Baron RN

## 2023-04-07 ENCOUNTER — CLINICAL DOCUMENTATION (OUTPATIENT)
Dept: ONCOLOGY | Age: 48
End: 2023-04-07

## 2023-04-07 PROBLEM — D50.8 IRON DEFICIENCY ANEMIA SECONDARY TO INADEQUATE DIETARY IRON INTAKE: Status: ACTIVE | Noted: 2023-04-07

## 2023-04-07 LAB — PATH REV BLD -IMP: NORMAL

## 2023-04-07 NOTE — PROGRESS NOTES
Call to patient re low iron and plan to p/w IV iron infusions as scheduled. Also recommended to patient that she takes Vitamin B12 supplements 500mcg daily. Pt VU and appreciated the call.

## 2023-04-09 LAB
COPPER SERPL-MCNC: 242 UG/DL (ref 80–158)
ZINC SERPL-MCNC: 56 UG/DL (ref 44–115)

## 2023-04-13 ENCOUNTER — HOSPITAL ENCOUNTER (OUTPATIENT)
Dept: INFUSION THERAPY | Age: 48
Discharge: HOME | End: 2023-04-13
Payer: OTHER GOVERNMENT

## 2023-04-13 VITALS
RESPIRATION RATE: 16 BRPM | HEART RATE: 82 BPM | OXYGEN SATURATION: 98 % | DIASTOLIC BLOOD PRESSURE: 69 MMHG | SYSTOLIC BLOOD PRESSURE: 100 MMHG | TEMPERATURE: 97.3 F

## 2023-04-13 DIAGNOSIS — O99.012 ANEMIA AFFECTING PREGNANCY IN SECOND TRIMESTER: Primary | ICD-10-CM

## 2023-04-13 PROCEDURE — 2580000003 HC RX 258: Performed by: INTERNAL MEDICINE

## 2023-04-13 PROCEDURE — 6360000002 HC RX W HCPCS: Performed by: INTERNAL MEDICINE

## 2023-04-13 PROCEDURE — 96365 THER/PROPH/DIAG IV INF INIT: CPT

## 2023-04-13 RX ORDER — HEPARIN SODIUM (PORCINE) LOCK FLUSH IV SOLN 100 UNIT/ML 100 UNIT/ML
500 SOLUTION INTRAVENOUS PRN
OUTPATIENT
Start: 2023-04-20

## 2023-04-13 RX ORDER — SODIUM CHLORIDE 0.9 % (FLUSH) 0.9 %
5-40 SYRINGE (ML) INJECTION PRN
Status: DISCONTINUED | OUTPATIENT
Start: 2023-04-13 | End: 2023-04-14 | Stop reason: HOSPADM

## 2023-04-13 RX ORDER — SODIUM CHLORIDE 9 MG/ML
INJECTION, SOLUTION INTRAVENOUS CONTINUOUS
OUTPATIENT
Start: 2023-04-20

## 2023-04-13 RX ORDER — SODIUM CHLORIDE 9 MG/ML
5-250 INJECTION, SOLUTION INTRAVENOUS PRN
OUTPATIENT
Start: 2023-04-20

## 2023-04-13 RX ORDER — ALBUTEROL SULFATE 90 UG/1
4 AEROSOL, METERED RESPIRATORY (INHALATION) PRN
Status: CANCELLED | OUTPATIENT
Start: 2023-04-20

## 2023-04-13 RX ORDER — DIPHENHYDRAMINE HYDROCHLORIDE 50 MG/ML
50 INJECTION INTRAMUSCULAR; INTRAVENOUS
Status: DISCONTINUED | OUTPATIENT
Start: 2023-04-13 | End: 2023-04-14 | Stop reason: HOSPADM

## 2023-04-13 RX ORDER — DIPHENHYDRAMINE HYDROCHLORIDE 50 MG/ML
50 INJECTION INTRAMUSCULAR; INTRAVENOUS
Status: CANCELLED | OUTPATIENT
Start: 2023-04-20

## 2023-04-13 RX ORDER — SODIUM CHLORIDE 9 MG/ML
5-250 INJECTION, SOLUTION INTRAVENOUS PRN
Status: DISCONTINUED | OUTPATIENT
Start: 2023-04-13 | End: 2023-04-14 | Stop reason: HOSPADM

## 2023-04-13 RX ORDER — SODIUM CHLORIDE 9 MG/ML
5-250 INJECTION, SOLUTION INTRAVENOUS PRN
Status: CANCELLED | OUTPATIENT
Start: 2023-04-20

## 2023-04-13 RX ORDER — EPINEPHRINE 1 MG/ML
0.3 INJECTION, SOLUTION, CONCENTRATE INTRAVENOUS PRN
Status: CANCELLED | OUTPATIENT
Start: 2023-04-20

## 2023-04-13 RX ORDER — ACETAMINOPHEN 325 MG/1
650 TABLET ORAL
Status: CANCELLED | OUTPATIENT
Start: 2023-04-20

## 2023-04-13 RX ORDER — SODIUM CHLORIDE 0.9 % (FLUSH) 0.9 %
5-40 SYRINGE (ML) INJECTION PRN
Status: CANCELLED | OUTPATIENT
Start: 2023-04-20

## 2023-04-13 RX ORDER — ONDANSETRON 2 MG/ML
8 INJECTION INTRAMUSCULAR; INTRAVENOUS
Status: CANCELLED | OUTPATIENT
Start: 2023-04-20

## 2023-04-13 RX ADMIN — SODIUM CHLORIDE 100 ML/HR: 9 INJECTION, SOLUTION INTRAVENOUS at 08:40

## 2023-04-13 RX ADMIN — SODIUM CHLORIDE, PRESERVATIVE FREE 10 ML: 5 INJECTION INTRAVENOUS at 08:30

## 2023-04-13 RX ADMIN — FERRIC CARBOXYMALTOSE INJECTION 750 MG: 50 INJECTION, SOLUTION INTRAVENOUS at 08:56

## 2023-04-13 NOTE — PROGRESS NOTES
Arrived to the [facility]. ramu completed. Patient observed 30 minutes post infusion. Patient tolerated well. Any issues or concerns during appointment: none. Patient aware of next infusion appointment on 4/20/2023 (date) at 0830 (time). Patient aware of next lab and [facility] office visit on 6/22/2023 (date) at 0730 (time). Patient instructed to call provider with temperature of 100.4 or greater or nausea/vomiting/ diarrhea or pain not controlled by medications  Discharged ambulatory.

## 2023-04-17 ENCOUNTER — HOSPITAL ENCOUNTER (OUTPATIENT)
Age: 48
Discharge: HOME | End: 2023-04-17
Attending: OBSTETRICS & GYNECOLOGY | Admitting: OBSTETRICS & GYNECOLOGY
Payer: OTHER GOVERNMENT

## 2023-04-17 VITALS
OXYGEN SATURATION: 100 % | HEART RATE: 98 BPM | TEMPERATURE: 97.9 F | WEIGHT: 185 LBS | HEIGHT: 65 IN | SYSTOLIC BLOOD PRESSURE: 103 MMHG | BODY MASS INDEX: 30.82 KG/M2 | DIASTOLIC BLOOD PRESSURE: 61 MMHG | RESPIRATION RATE: 16 BRPM

## 2023-04-17 LAB
FLUAV RNA SPEC QL NAA+PROBE: NOT DETECTED
FLUBV RNA SPEC QL NAA+PROBE: NOT DETECTED
SARS-COV-2 RDRP RESP QL NAA+PROBE: NOT DETECTED
SOURCE: NORMAL

## 2023-04-17 PROCEDURE — 99283 EMERGENCY DEPT VISIT LOW MDM: CPT

## 2023-04-17 PROCEDURE — 94664 DEMO&/EVAL PT USE INHALER: CPT

## 2023-04-17 PROCEDURE — 87635 SARS-COV-2 COVID-19 AMP PRB: CPT

## 2023-04-17 PROCEDURE — 6370000000 HC RX 637 (ALT 250 FOR IP): Performed by: OBSTETRICS & GYNECOLOGY

## 2023-04-17 PROCEDURE — 87502 INFLUENZA DNA AMP PROBE: CPT

## 2023-04-17 PROCEDURE — 94640 AIRWAY INHALATION TREATMENT: CPT

## 2023-04-17 RX ORDER — AMOXICILLIN AND CLAVULANATE POTASSIUM 875; 125 MG/1; MG/1
1 TABLET, FILM COATED ORAL 2 TIMES DAILY
Qty: 14 TABLET | Refills: 0 | Status: SHIPPED | OUTPATIENT
Start: 2023-04-17 | End: 2023-04-24

## 2023-04-17 RX ORDER — FLUTICASONE PROPIONATE AND SALMETEROL 100; 50 UG/1; UG/1
1 POWDER RESPIRATORY (INHALATION) EVERY 12 HOURS
Qty: 1 EACH | Refills: 1 | Status: SHIPPED | OUTPATIENT
Start: 2023-04-17

## 2023-04-17 RX ORDER — IPRATROPIUM BROMIDE AND ALBUTEROL SULFATE 2.5; .5 MG/3ML; MG/3ML
1 SOLUTION RESPIRATORY (INHALATION) EVERY 4 HOURS PRN
Status: DISCONTINUED | OUTPATIENT
Start: 2023-04-17 | End: 2023-04-17 | Stop reason: HOSPADM

## 2023-04-17 RX ADMIN — IPRATROPIUM BROMIDE AND ALBUTEROL SULFATE 1 AMPULE: .5; 3 SOLUTION RESPIRATORY (INHALATION) at 11:02

## 2023-04-17 ASSESSMENT — PAIN DESCRIPTION - LOCATION: LOCATION: ABDOMEN

## 2023-04-17 ASSESSMENT — PAIN - FUNCTIONAL ASSESSMENT: PAIN_FUNCTIONAL_ASSESSMENT: 0-10

## 2023-04-17 ASSESSMENT — PAIN DESCRIPTION - ORIENTATION: ORIENTATION: LOWER

## 2023-04-17 ASSESSMENT — PAIN SCALES - GENERAL: PAINLEVEL_OUTOF10: 2

## 2023-04-17 NOTE — PROGRESS NOTES
Patient discharged home with  labor precautions. Discharge information given to patient and patient verbalized understanding. No further needs or concerns noted. Patient left unit walking alone.

## 2023-04-17 NOTE — PROGRESS NOTES
Patient arrived from [facility] triage for c/o cough that started 3 days ago. States she also had a headache and urinary incontinence when she coughs. Denies LOF outside of coughing, States's she had abdominal discomfort related to coughing. Denies vaginal bleeding and reports (+)FM. Dr Recio Records notified of patients arrival and CC.

## 2023-04-17 NOTE — ED TRIAGE NOTES
Patient advises 29 weeks pregnant with 2nd pregnancy and 1 live birth. Patient advises she stared with lower abdominal cramping last night, no vaginal bleeding or discharge. Patient advises started with sinus problems about 4 days ago, some incontinence with cough.

## 2023-04-17 NOTE — H&P
(SYNTHROID) 100 MCG tablet Take 1 tablet by mouth every morning (before breakfast) 23   Adeline Blank MD   aspirin 81 MG EC tablet Take by mouth daily    Ar Automatic Reconciliation   Cholecalciferol 50 MCG ( UT) TABS Take by mouth    Ar Automatic Reconciliation   sertraline (ZOLOFT) 100 MG tablet Take 100 mg by mouth  Patient not taking: Reported on 2023   Ar Automatic Reconciliation        Review of Systems:  Complete review of systems performed. Those not specifically mentioned in the HPI are either negative are non related to this patient encounter. Objective:     Vitals:    Vitals:    23 0927 23 0957 23 1000   BP: 100/65  103/61   Pulse: (!) 104 94 98   Resp: 16  16   Temp: 98.5 °F (36.9 °C)  97.9 °F (36.6 °C)   TempSrc: Oral  Oral   SpO2: 98% 100% 100%   Weight: 185 lb (83.9 kg)     Height: 5' 5\" (1.651 m)        Temp (24hrs), Av.2 °F (36.8 °C), Min:97.9 °F (36.6 °C), Max:98.5 °F (36.9 °C)    BP  Min: 100/65  Max: 103/61       Physical Exam:  Heart: RRR  Lungs: upper airway coarse breath sounds, inspiratory wheeze noted. Adb: soft, nt nd, gravid  Ext: no edema noted    Membranes:  Intact  Uterine Activity:  None  Fetal Heart Rate:  Accelerations: yes  Decelerations: none       Lab/Data Review:  Recent Results (from the past 24 hour(s))   COVID-19, Rapid    Collection Time: 23 10:47 AM    Specimen: Nasopharyngeal   Result Value Ref Range    Source NASAL SWAB      SARS-CoV-2, Rapid Not detected NOTD     Influenza A/B, Molecular    Collection Time: 23 10:47 AM    Specimen: Not Specified   Result Value Ref Range    Influenza A, CARRIE Not detected NOTD      Influenza B, CARRIE Not detected NOTD         Assessment and Plan:   29w1d with initial complaints of URI symptoms. (-) covid, flu testing. DC home with advair, augmentin. Keep appt with primary MD in am.      Labor precautions given.   Patient was told to return to  immediately if she experiences

## 2023-04-18 ENCOUNTER — ROUTINE PRENATAL (OUTPATIENT)
Dept: OBGYN CLINIC | Age: 48
End: 2023-04-18
Payer: OTHER GOVERNMENT

## 2023-04-18 VITALS — HEART RATE: 106 BPM | SYSTOLIC BLOOD PRESSURE: 111 MMHG | DIASTOLIC BLOOD PRESSURE: 68 MMHG

## 2023-04-18 DIAGNOSIS — O09.812 PREGNANCY RESULTING FROM IN VITRO FERTILIZATION IN SECOND TRIMESTER: ICD-10-CM

## 2023-04-18 DIAGNOSIS — O09.292 CURRENT PREGNANCY IN SECOND TRIMESTER WITH HISTORY OF PLACENTA PREVIA DURING PRIOR PREGNANCY: ICD-10-CM

## 2023-04-18 DIAGNOSIS — D50.8 IRON DEFICIENCY ANEMIA SECONDARY TO INADEQUATE DIETARY IRON INTAKE: ICD-10-CM

## 2023-04-18 DIAGNOSIS — E03.9 HYPOTHYROIDISM AFFECTING PREGNANCY IN SECOND TRIMESTER: ICD-10-CM

## 2023-04-18 DIAGNOSIS — Z3A.29 29 WEEKS GESTATION OF PREGNANCY: ICD-10-CM

## 2023-04-18 DIAGNOSIS — Z86.59 HISTORY OF DEPRESSION: ICD-10-CM

## 2023-04-18 DIAGNOSIS — O09.522 HIGH RISK PREGNANCY, MULTIGRAVIDA OF ADVANCED MATERNAL AGE IN SECOND TRIMESTER: Primary | ICD-10-CM

## 2023-04-18 DIAGNOSIS — O99.282 HYPOTHYROIDISM AFFECTING PREGNANCY IN SECOND TRIMESTER: ICD-10-CM

## 2023-04-18 DIAGNOSIS — O40.3XX0 POLYHYDRAMNIOS AFFECTING PREGNANCY IN THIRD TRIMESTER: ICD-10-CM

## 2023-04-18 DIAGNOSIS — O99.342 ANXIETY DURING PREGNANCY IN SECOND TRIMESTER, ANTEPARTUM: ICD-10-CM

## 2023-04-18 DIAGNOSIS — J06.9 UPPER RESPIRATORY TRACT INFECTION, UNSPECIFIED TYPE: ICD-10-CM

## 2023-04-18 DIAGNOSIS — O99.012 ANEMIA AFFECTING PREGNANCY IN SECOND TRIMESTER: ICD-10-CM

## 2023-04-18 DIAGNOSIS — F41.9 ANXIETY DURING PREGNANCY IN SECOND TRIMESTER, ANTEPARTUM: ICD-10-CM

## 2023-04-18 PROCEDURE — 76816 OB US FOLLOW-UP PER FETUS: CPT | Performed by: OBSTETRICS & GYNECOLOGY

## 2023-04-18 PROCEDURE — 76819 FETAL BIOPHYS PROFIL W/O NST: CPT | Performed by: OBSTETRICS & GYNECOLOGY

## 2023-04-18 PROCEDURE — 99214 OFFICE O/P EST MOD 30 MIN: CPT | Performed by: OBSTETRICS & GYNECOLOGY

## 2023-04-18 PROCEDURE — 76826 ECHO EXAM OF FETAL HEART: CPT | Performed by: OBSTETRICS & GYNECOLOGY

## 2023-04-18 PROCEDURE — 93325 DOPPLER ECHO COLOR FLOW MAPG: CPT | Performed by: OBSTETRICS & GYNECOLOGY

## 2023-04-18 RX ORDER — BENZONATATE 200 MG/1
200 CAPSULE ORAL 3 TIMES DAILY PRN
Qty: 30 CAPSULE | Refills: 0 | Status: SHIPPED | OUTPATIENT
Start: 2023-04-18 | End: 2023-04-25

## 2023-04-18 ASSESSMENT — PATIENT HEALTH QUESTIONNAIRE - PHQ9
SUM OF ALL RESPONSES TO PHQ QUESTIONS 1-9: 2
SUM OF ALL RESPONSES TO PHQ QUESTIONS 1-9: 2
1. LITTLE INTEREST OR PLEASURE IN DOING THINGS: 2
SUM OF ALL RESPONSES TO PHQ QUESTIONS 1-9: 2
SUM OF ALL RESPONSES TO PHQ QUESTIONS 1-9: 2
2. FEELING DOWN, DEPRESSED OR HOPELESS: 0
SUM OF ALL RESPONSES TO PHQ9 QUESTIONS 1 & 2: 2

## 2023-04-18 NOTE — PROGRESS NOTES
COVID.)  Additional plans and concerns as documented in problem list.   All questions answered and concerns discussed. I have spent 34 minutes reviewing previous notes, test results and face to face with the patient discussing the diagnosis and importance of compliance with the treatment plan, as well as documenting on the day of the visit (04/18/2023). Ultrasound findings were discussed separately and are not included in this time calculation. An electronic signature was used to authenticate this note. Renea Cantu MD      Return if symptoms worsen or fail to improve. Patient Active Problem List   Diagnosis Code    Current pregnancy in second trimester with history of placenta previa during prior pregnancy O09.292    Hypothyroidism affecting pregnancy in second trimester O99.282, E03.9    History of depression Z86.59    High risk pregnancy, multigravida of advanced maternal age in second trimester O09.522    Anemia affecting pregnancy in second trimester O99.012    Anxiety during pregnancy in second trimester, antepartum O99.342, F41.9    Uterine scar from previous surgery affecting pregnancy O34.29    Pregnancy resulting from in vitro fertilization in second trimester O09.812    Iron deficiency anemia secondary to inadequate dietary iron intake D50.8    Polyhydramnios affecting pregnancy in third trimester O40. 3XX0     Visit Diagnoses         Codes    29 weeks gestation of pregnancy     Z3A.29    BMI 30.0-30.9,adult     Z68.30    Upper respiratory tract infection, unspecified type     J06.9

## 2023-04-20 ENCOUNTER — HOSPITAL ENCOUNTER (OUTPATIENT)
Dept: INFUSION THERAPY | Age: 48
Discharge: HOME | End: 2023-04-20
Payer: OTHER GOVERNMENT

## 2023-04-20 VITALS
OXYGEN SATURATION: 97 % | SYSTOLIC BLOOD PRESSURE: 92 MMHG | RESPIRATION RATE: 16 BRPM | DIASTOLIC BLOOD PRESSURE: 60 MMHG | HEART RATE: 90 BPM | TEMPERATURE: 98.4 F

## 2023-04-20 DIAGNOSIS — O99.012 ANEMIA AFFECTING PREGNANCY IN SECOND TRIMESTER: Primary | ICD-10-CM

## 2023-04-20 PROCEDURE — 2580000003 HC RX 258: Performed by: INTERNAL MEDICINE

## 2023-04-20 PROCEDURE — 96365 THER/PROPH/DIAG IV INF INIT: CPT

## 2023-04-20 PROCEDURE — 6360000002 HC RX W HCPCS: Performed by: INTERNAL MEDICINE

## 2023-04-20 RX ORDER — SODIUM CHLORIDE 9 MG/ML
5-250 INJECTION, SOLUTION INTRAVENOUS PRN
Status: CANCELLED | OUTPATIENT
Start: 2023-04-27

## 2023-04-20 RX ORDER — ALBUTEROL SULFATE 90 UG/1
4 AEROSOL, METERED RESPIRATORY (INHALATION) PRN
OUTPATIENT
Start: 2023-04-27

## 2023-04-20 RX ORDER — DIPHENHYDRAMINE HYDROCHLORIDE 50 MG/ML
50 INJECTION INTRAMUSCULAR; INTRAVENOUS
OUTPATIENT
Start: 2023-04-27

## 2023-04-20 RX ORDER — SODIUM CHLORIDE 0.9 % (FLUSH) 0.9 %
5-40 SYRINGE (ML) INJECTION PRN
Status: DISCONTINUED | OUTPATIENT
Start: 2023-04-20 | End: 2023-04-21 | Stop reason: HOSPADM

## 2023-04-20 RX ORDER — SODIUM CHLORIDE 9 MG/ML
5-250 INJECTION, SOLUTION INTRAVENOUS PRN
Status: DISCONTINUED | OUTPATIENT
Start: 2023-04-20 | End: 2023-04-21 | Stop reason: HOSPADM

## 2023-04-20 RX ORDER — ACETAMINOPHEN 325 MG/1
650 TABLET ORAL
Status: DISCONTINUED | OUTPATIENT
Start: 2023-04-20 | End: 2023-04-21 | Stop reason: HOSPADM

## 2023-04-20 RX ORDER — DIPHENHYDRAMINE HYDROCHLORIDE 50 MG/ML
50 INJECTION INTRAMUSCULAR; INTRAVENOUS
Status: DISCONTINUED | OUTPATIENT
Start: 2023-04-20 | End: 2023-04-21 | Stop reason: HOSPADM

## 2023-04-20 RX ORDER — ONDANSETRON 2 MG/ML
8 INJECTION INTRAMUSCULAR; INTRAVENOUS
Status: DISCONTINUED | OUTPATIENT
Start: 2023-04-20 | End: 2023-04-21 | Stop reason: HOSPADM

## 2023-04-20 RX ORDER — EPINEPHRINE 1 MG/ML
0.3 INJECTION, SOLUTION, CONCENTRATE INTRAVENOUS PRN
OUTPATIENT
Start: 2023-04-27

## 2023-04-20 RX ORDER — SODIUM CHLORIDE 9 MG/ML
INJECTION, SOLUTION INTRAVENOUS CONTINUOUS
OUTPATIENT
Start: 2023-04-27

## 2023-04-20 RX ORDER — SODIUM CHLORIDE 0.9 % (FLUSH) 0.9 %
5-40 SYRINGE (ML) INJECTION PRN
OUTPATIENT
Start: 2023-04-27

## 2023-04-20 RX ORDER — EPINEPHRINE 1 MG/ML
0.3 INJECTION, SOLUTION, CONCENTRATE INTRAVENOUS PRN
Status: DISCONTINUED | OUTPATIENT
Start: 2023-04-20 | End: 2023-04-21 | Stop reason: HOSPADM

## 2023-04-20 RX ORDER — ONDANSETRON 2 MG/ML
8 INJECTION INTRAMUSCULAR; INTRAVENOUS
OUTPATIENT
Start: 2023-04-27

## 2023-04-20 RX ORDER — SODIUM CHLORIDE 9 MG/ML
5-250 INJECTION, SOLUTION INTRAVENOUS PRN
OUTPATIENT
Start: 2023-04-27

## 2023-04-20 RX ORDER — ACETAMINOPHEN 325 MG/1
650 TABLET ORAL
OUTPATIENT
Start: 2023-04-27

## 2023-04-20 RX ORDER — ALBUTEROL SULFATE 90 UG/1
4 AEROSOL, METERED RESPIRATORY (INHALATION) PRN
Status: DISCONTINUED | OUTPATIENT
Start: 2023-04-20 | End: 2023-04-21 | Stop reason: HOSPADM

## 2023-04-20 RX ORDER — HEPARIN SODIUM (PORCINE) LOCK FLUSH IV SOLN 100 UNIT/ML 100 UNIT/ML
500 SOLUTION INTRAVENOUS PRN
OUTPATIENT
Start: 2023-04-27

## 2023-04-20 RX ADMIN — FERRIC CARBOXYMALTOSE INJECTION 750 MG: 50 INJECTION, SOLUTION INTRAVENOUS at 09:14

## 2023-04-20 RX ADMIN — SODIUM CHLORIDE 100 ML/HR: 9 INJECTION, SOLUTION INTRAVENOUS at 09:01

## 2023-04-20 RX ADMIN — SODIUM CHLORIDE, PRESERVATIVE FREE 10 ML: 5 INJECTION INTRAVENOUS at 09:00

## 2023-04-20 NOTE — PROGRESS NOTES
Arrived to the [facility]. Injectafer completed. Patient observed for 30 minutes post-infusion. Patient tolerated well. Any issues or concerns during appointment: no.  Patient aware of next lab and [facility] office visit on 6/22/23 (date) at 0730 (time). Patient instructed to call provider with temperature of 100.4 or greater or nausea/vomiting/ diarrhea or pain not controlled by medications  Discharged ambulatory.

## 2023-05-28 ENCOUNTER — HOSPITAL ENCOUNTER (OUTPATIENT)
Age: 48
Discharge: HOME | End: 2023-05-28
Attending: STUDENT IN AN ORGANIZED HEALTH CARE EDUCATION/TRAINING PROGRAM | Admitting: OBSTETRICS & GYNECOLOGY
Payer: OTHER GOVERNMENT

## 2023-05-28 VITALS
RESPIRATION RATE: 20 BRPM | HEIGHT: 65 IN | WEIGHT: 197 LBS | BODY MASS INDEX: 32.82 KG/M2 | HEART RATE: 80 BPM | SYSTOLIC BLOOD PRESSURE: 102 MMHG | TEMPERATURE: 97.5 F | OXYGEN SATURATION: 98 % | DIASTOLIC BLOOD PRESSURE: 69 MMHG

## 2023-05-28 PROBLEM — R10.33 PERIUMBILICAL PAIN: Status: ACTIVE | Noted: 2023-05-28

## 2023-05-28 PROBLEM — O36.8139 DECREASED FETAL MOVEMENT AFFECTING MANAGEMENT OF PREGNANCY IN THIRD TRIMESTER, OTHER FETUS: Status: ACTIVE | Noted: 2023-05-28

## 2023-05-28 PROBLEM — R10.9 ABDOMINAL PAIN DURING PREGNANCY IN THIRD TRIMESTER: Status: ACTIVE | Noted: 2023-05-28

## 2023-05-28 PROBLEM — Z3A.35 35 WEEKS GESTATION OF PREGNANCY: Status: ACTIVE | Noted: 2023-05-28

## 2023-05-28 PROBLEM — O47.03 PRETERM UTERINE CONTRACTIONS IN THIRD TRIMESTER, ANTEPARTUM: Status: ACTIVE | Noted: 2023-05-28

## 2023-05-28 PROBLEM — O26.893 ABDOMINAL PAIN DURING PREGNANCY IN THIRD TRIMESTER: Status: ACTIVE | Noted: 2023-05-28

## 2023-05-28 PROCEDURE — 6360000002 HC RX W HCPCS: Performed by: OBSTETRICS & GYNECOLOGY

## 2023-05-28 PROCEDURE — 96372 THER/PROPH/DIAG INJ SC/IM: CPT

## 2023-05-28 PROCEDURE — 6370000000 HC RX 637 (ALT 250 FOR IP): Performed by: OBSTETRICS & GYNECOLOGY

## 2023-05-28 PROCEDURE — 2580000003 HC RX 258: Performed by: OBSTETRICS & GYNECOLOGY

## 2023-05-28 PROCEDURE — 96360 HYDRATION IV INFUSION INIT: CPT

## 2023-05-28 PROCEDURE — 99285 EMERGENCY DEPT VISIT HI MDM: CPT

## 2023-05-28 RX ORDER — ACETAMINOPHEN 500 MG
1000 TABLET ORAL ONCE
Status: COMPLETED | OUTPATIENT
Start: 2023-05-28 | End: 2023-05-28

## 2023-05-28 RX ORDER — SODIUM CHLORIDE, SODIUM LACTATE, POTASSIUM CHLORIDE, AND CALCIUM CHLORIDE .6; .31; .03; .02 G/100ML; G/100ML; G/100ML; G/100ML
1000 INJECTION, SOLUTION INTRAVENOUS ONCE
Status: COMPLETED | OUTPATIENT
Start: 2023-05-28 | End: 2023-05-28

## 2023-05-28 RX ORDER — TERBUTALINE SULFATE 1 MG/ML
0.25 INJECTION, SOLUTION SUBCUTANEOUS ONCE
Status: COMPLETED | OUTPATIENT
Start: 2023-05-28 | End: 2023-05-28

## 2023-05-28 RX ADMIN — SODIUM CHLORIDE, POTASSIUM CHLORIDE, SODIUM LACTATE AND CALCIUM CHLORIDE 1000 ML: 600; 310; 30; 20 INJECTION, SOLUTION INTRAVENOUS at 21:36

## 2023-05-28 RX ADMIN — ACETAMINOPHEN 1000 MG: 500 TABLET, FILM COATED ORAL at 21:30

## 2023-05-28 RX ADMIN — TERBUTALINE SULFATE 0.25 MG: 1 INJECTION SUBCUTANEOUS at 21:30

## 2023-05-28 ASSESSMENT — ENCOUNTER SYMPTOMS
VOMITING: 1
ABDOMINAL PAIN: 1
CONSTIPATION: 1
BACK PAIN: 0
NAUSEA: 1
SHORTNESS OF BREATH: 0

## 2023-05-28 ASSESSMENT — PAIN SCALES - GENERAL: PAINLEVEL_OUTOF10: 6

## 2023-05-29 NOTE — PROGRESS NOTES
Discharge instruction given. Information/teaching printout given to patient. States understanding. All questions answered. Informed pt to return to hospital for decreased fetal movement, if she suspects her water breaks, if vaginal bleeding occurs that is more than spotting, or she starts to experience painful regular contractions 4-5 minutes apart. Pt verbalizes understanding. Discussed importance of follow up with primary MD. Ambulate out to personal auto with  at side. Pt stable at discharge.

## 2023-05-29 NOTE — PROGRESS NOTES
[facility] ED     Pt to [facility] with complaints of intermittent sharp pain around umbilicus. Jennifer Petit EFM and TOCO applied. Abd palpated soft. Positive fetal movement noted, denies LOF or VB. Denies recent Covid exposure or symptoms.  [facility] notified of patient's arrival.

## 2023-05-29 NOTE — H&P
[facility] H&P    Chief Complaint   Patient presents with    Abdominal Pain     Pain around umbilicus ~ 2 hours     HPI patient is a 42 yo [id number] with CRISTAL 7/2/2023 at  35 weeks 0 days by embryo transfer who presents with periumbilical pain    Onset: today - continuous for last 2 hours  Quality:  has had pulling pain at the umbilicus for week but never continuous and worse today - sharp exacerbations that last seconds. Worse with abdominal tightness and notes some contractions, feels a tugging every 6 minutes. Worse with movement  Severity:  5/10  Associated: previous c/s for previa,  patient reports decreased fetal movement, denies vaginal bleeding or LOF    Prenatal Care: [facility] - prenatal records reviewed, pregnancy complicated by prior c/s, Assisted reproduction, AMA    Past Medical History:   Diagnosis Date    Anemia     iron supplements as a child    Anxiety 2011    was on celexa    Blighted ovum 11/22/2011    Hypothyroidism affecting pregnancy in first trimester 12/3/2020    Infertility, female     Placenta previa in second trimester 12/7/2020 12/7/2020 [facility]: Posterior placenta covers internal os posteriorly. 1/21/2021 at [facility]- Placental edge still covering internal os. NO findings  of PAS. Still has potential to resolve term. 2/14/2021 Inpatient Consult- Patient presented last night with painless  vaginal bleeding, was having contractions. No further bleeding, irregular  contractions. Reassuring fetal status, no sign of abruption.  E    Placenta previa with hemorrhage in third trimester 2/14/2021    Psychiatric disorder     depression    Sciatica     Vaginal bleeding in pregnancy, third trimester 2/14/2021       Past Surgical History:   Procedure Laterality Date    APPENDECTOMY      APPENDECTOMY,W OTHR C           Family History   Problem Relation Age of Onset    Stroke Maternal Grandmother     Diabetes Maternal Grandmother        Social History     Socioeconomic History    Marital status:

## 2023-05-29 NOTE — PROGRESS NOTES
Provider Testing: Nonstress test    Indications:  35 weeks,  periumbilical abdominal pain    NST results[de-identified]  Reactive    EFM:  baseline 130, accelerations present,  decelerations absent, moderate variability  Tocos:  q 5-12 minutes, lasting   60 seconds, mild to moderate    Start: 20:48  End:  22:03    Category:  1

## 2023-06-21 RX ORDER — LEVOTHYROXINE SODIUM 0.1 MG/1
TABLET ORAL
Qty: 30 TABLET | Refills: 3 | Status: SHIPPED | OUTPATIENT
Start: 2023-06-21

## 2023-06-22 ENCOUNTER — CLINICAL DOCUMENTATION (OUTPATIENT)
Dept: ONCOLOGY | Age: 48
End: 2023-06-22

## 2023-06-26 ENCOUNTER — ANESTHESIA EVENT (OUTPATIENT)
Dept: OPERATING ROOM | Age: 48
DRG: 772 | End: 2023-06-26
Payer: OTHER GOVERNMENT

## 2023-06-26 ENCOUNTER — HOSPITAL ENCOUNTER (INPATIENT)
Age: 48
LOS: 3 days | Discharge: HOME | DRG: 772 | End: 2023-06-29
Attending: STUDENT IN AN ORGANIZED HEALTH CARE EDUCATION/TRAINING PROGRAM | Admitting: OBSTETRICS & GYNECOLOGY
Payer: OTHER GOVERNMENT

## 2023-06-26 ENCOUNTER — ANESTHESIA (OUTPATIENT)
Dept: OPERATING ROOM | Age: 48
DRG: 772 | End: 2023-06-26
Payer: OTHER GOVERNMENT

## 2023-06-26 DIAGNOSIS — O34.219 PREVIOUS CESAREAN DELIVERY AFFECTING PREGNANCY: Primary | ICD-10-CM

## 2023-06-26 PROBLEM — Z98.891 PREVIOUS CESAREAN SECTION: Status: ACTIVE | Noted: 2023-06-26

## 2023-06-26 LAB
ABO + RH BLD: NORMAL
BLOOD GROUP ANTIBODIES SERPL: NORMAL
ERYTHROCYTE [DISTWIDTH] IN BLOOD BY AUTOMATED COUNT: 16.5 % (ref 11.9–14.6)
HCT VFR BLD AUTO: 37.6 % (ref 35.8–46.3)
HGB BLD-MCNC: 12.9 G/DL (ref 11.7–15.4)
MCH RBC QN AUTO: 28.2 PG (ref 26.1–32.9)
MCHC RBC AUTO-ENTMCNC: 34.3 G/DL (ref 31.4–35)
MCV RBC AUTO: 82.3 FL (ref 82–102)
NRBC # BLD: 0 K/UL (ref 0–0.2)
PLATELET # BLD AUTO: 378 K/UL (ref 150–450)
PMV BLD AUTO: 10 FL (ref 9.4–12.3)
RBC # BLD AUTO: 4.57 M/UL (ref 4.05–5.2)
SPECIMEN EXP DATE BLD: NORMAL
WBC # BLD AUTO: 9.4 K/UL (ref 4.3–11.1)

## 2023-06-26 PROCEDURE — 2709999900 HC NON-CHARGEABLE SUPPLY: Performed by: OBSTETRICS & GYNECOLOGY

## 2023-06-26 PROCEDURE — 6360000002 HC RX W HCPCS: Performed by: NURSE ANESTHETIST, CERTIFIED REGISTERED

## 2023-06-26 PROCEDURE — 85027 COMPLETE CBC AUTOMATED: CPT

## 2023-06-26 PROCEDURE — 7100000000 HC PACU RECOVERY - FIRST 15 MIN: Performed by: OBSTETRICS & GYNECOLOGY

## 2023-06-26 PROCEDURE — 7100000001 HC PACU RECOVERY - ADDTL 15 MIN: Performed by: OBSTETRICS & GYNECOLOGY

## 2023-06-26 PROCEDURE — 3609079900 HC CESAREAN SECTION: Performed by: OBSTETRICS & GYNECOLOGY

## 2023-06-26 PROCEDURE — 6370000000 HC RX 637 (ALT 250 FOR IP): Performed by: ANESTHESIOLOGY

## 2023-06-26 PROCEDURE — 3700000001 HC ADD 15 MINUTES (ANESTHESIA): Performed by: OBSTETRICS & GYNECOLOGY

## 2023-06-26 PROCEDURE — C1765 ADHESION BARRIER: HCPCS | Performed by: OBSTETRICS & GYNECOLOGY

## 2023-06-26 PROCEDURE — 86900 BLOOD TYPING SEROLOGIC ABO: CPT

## 2023-06-26 PROCEDURE — 3E0P05Z INTRODUCTION OF ADHESION BARRIER INTO FEMALE REPRODUCTIVE, OPEN APPROACH: ICD-10-PCS | Performed by: OBSTETRICS & GYNECOLOGY

## 2023-06-26 PROCEDURE — A4216 STERILE WATER/SALINE, 10 ML: HCPCS | Performed by: ANESTHESIOLOGY

## 2023-06-26 PROCEDURE — 6360000002 HC RX W HCPCS: Performed by: ANESTHESIOLOGY

## 2023-06-26 PROCEDURE — 2500000003 HC RX 250 WO HCPCS: Performed by: ANESTHESIOLOGY

## 2023-06-26 PROCEDURE — 86901 BLOOD TYPING SEROLOGIC RH(D): CPT

## 2023-06-26 PROCEDURE — 3700000000 HC ANESTHESIA ATTENDED CARE: Performed by: OBSTETRICS & GYNECOLOGY

## 2023-06-26 PROCEDURE — 6360000002 HC RX W HCPCS: Performed by: OBSTETRICS & GYNECOLOGY

## 2023-06-26 PROCEDURE — 86850 RBC ANTIBODY SCREEN: CPT

## 2023-06-26 PROCEDURE — 2580000003 HC RX 258: Performed by: NURSE ANESTHETIST, CERTIFIED REGISTERED

## 2023-06-26 PROCEDURE — 2580000003 HC RX 258: Performed by: OBSTETRICS & GYNECOLOGY

## 2023-06-26 PROCEDURE — 2500000003 HC RX 250 WO HCPCS: Performed by: NURSE ANESTHETIST, CERTIFIED REGISTERED

## 2023-06-26 PROCEDURE — 2580000003 HC RX 258: Performed by: ANESTHESIOLOGY

## 2023-06-26 PROCEDURE — 1100000000 HC RM PRIVATE

## 2023-06-26 PROCEDURE — 6370000000 HC RX 637 (ALT 250 FOR IP): Performed by: OBSTETRICS & GYNECOLOGY

## 2023-06-26 RX ORDER — ACETAMINOPHEN 325 MG/1
650 TABLET ORAL EVERY 4 HOURS PRN
Status: DISCONTINUED | OUTPATIENT
Start: 2023-06-26 | End: 2023-06-27

## 2023-06-26 RX ORDER — NALOXONE HYDROCHLORIDE 0.4 MG/ML
INJECTION, SOLUTION INTRAMUSCULAR; INTRAVENOUS; SUBCUTANEOUS PRN
Status: DISCONTINUED | OUTPATIENT
Start: 2023-06-26 | End: 2023-06-27

## 2023-06-26 RX ORDER — SODIUM CHLORIDE 0.9 % (FLUSH) 0.9 %
10 SYRINGE (ML) INJECTION EVERY 12 HOURS SCHEDULED
Status: DISCONTINUED | OUTPATIENT
Start: 2023-06-26 | End: 2023-06-27

## 2023-06-26 RX ORDER — ONDANSETRON 2 MG/ML
4 INJECTION INTRAMUSCULAR; INTRAVENOUS ONCE
Status: DISCONTINUED | OUTPATIENT
Start: 2023-06-26 | End: 2023-06-27

## 2023-06-26 RX ORDER — BUPIVACAINE HYDROCHLORIDE 7.5 MG/ML
INJECTION, SOLUTION INTRASPINAL
Status: COMPLETED | OUTPATIENT
Start: 2023-06-26 | End: 2023-06-26

## 2023-06-26 RX ORDER — SODIUM CHLORIDE, SODIUM LACTATE, POTASSIUM CHLORIDE, AND CALCIUM CHLORIDE .6; .31; .03; .02 G/100ML; G/100ML; G/100ML; G/100ML
1000 INJECTION, SOLUTION INTRAVENOUS ONCE
Status: COMPLETED | OUTPATIENT
Start: 2023-06-26 | End: 2023-06-26

## 2023-06-26 RX ORDER — OXYCODONE HYDROCHLORIDE 5 MG/1
5 TABLET ORAL EVERY 4 HOURS PRN
Status: DISCONTINUED | OUTPATIENT
Start: 2023-06-26 | End: 2023-06-27

## 2023-06-26 RX ORDER — MORPHINE SULFATE 0.5 MG/ML
INJECTION, SOLUTION EPIDURAL; INTRATHECAL; INTRAVENOUS PRN
Status: DISCONTINUED | OUTPATIENT
Start: 2023-06-26 | End: 2023-06-26

## 2023-06-26 RX ORDER — TRANEXAMIC ACID 10 MG/ML
INJECTION, SOLUTION INTRAVENOUS PRN
Status: DISCONTINUED | OUTPATIENT
Start: 2023-06-26 | End: 2023-06-26 | Stop reason: SDUPTHER

## 2023-06-26 RX ORDER — SODIUM CHLORIDE 0.9 % (FLUSH) 0.9 %
10 SYRINGE (ML) INJECTION PRN
Status: DISCONTINUED | OUTPATIENT
Start: 2023-06-26 | End: 2023-06-27

## 2023-06-26 RX ORDER — KETOROLAC TROMETHAMINE 30 MG/ML
INJECTION, SOLUTION INTRAMUSCULAR; INTRAVENOUS PRN
Status: DISCONTINUED | OUTPATIENT
Start: 2023-06-26 | End: 2023-06-26 | Stop reason: SDUPTHER

## 2023-06-26 RX ORDER — NALBUPHINE HYDROCHLORIDE 10 MG/ML
5 INJECTION, SOLUTION INTRAMUSCULAR; INTRAVENOUS; SUBCUTANEOUS EVERY 4 HOURS PRN
Status: DISCONTINUED | OUTPATIENT
Start: 2023-06-26 | End: 2023-06-27

## 2023-06-26 RX ORDER — SODIUM CHLORIDE, SODIUM LACTATE, POTASSIUM CHLORIDE, CALCIUM CHLORIDE 600; 310; 30; 20 MG/100ML; MG/100ML; MG/100ML; MG/100ML
INJECTION, SOLUTION INTRAVENOUS CONTINUOUS
Status: DISCONTINUED | OUTPATIENT
Start: 2023-06-26 | End: 2023-06-27

## 2023-06-26 RX ORDER — MORPHINE SULFATE 0.5 MG/ML
INJECTION, SOLUTION EPIDURAL; INTRATHECAL; INTRAVENOUS PRN
Status: DISCONTINUED | OUTPATIENT
Start: 2023-06-26 | End: 2023-06-26 | Stop reason: SDUPTHER

## 2023-06-26 RX ORDER — SODIUM CHLORIDE 9 MG/ML
INJECTION, SOLUTION INTRAVENOUS PRN
Status: DISCONTINUED | OUTPATIENT
Start: 2023-06-26 | End: 2023-06-27

## 2023-06-26 RX ORDER — HYDROMORPHONE HYDROCHLORIDE 1 MG/ML
0.25 INJECTION, SOLUTION INTRAMUSCULAR; INTRAVENOUS; SUBCUTANEOUS EVERY 6 HOURS PRN
Status: DISCONTINUED | OUTPATIENT
Start: 2023-06-26 | End: 2023-06-27

## 2023-06-26 RX ORDER — LIDOCAINE HYDROCHLORIDE 10 MG/ML
1 INJECTION, SOLUTION INFILTRATION; PERINEURAL
Status: DISCONTINUED | OUTPATIENT
Start: 2023-06-26 | End: 2023-06-27

## 2023-06-26 RX ORDER — PROCHLORPERAZINE EDISYLATE 5 MG/ML
INJECTION INTRAMUSCULAR; INTRAVENOUS PRN
Status: DISCONTINUED | OUTPATIENT
Start: 2023-06-26 | End: 2023-06-26 | Stop reason: SDUPTHER

## 2023-06-26 RX ORDER — ONDANSETRON 2 MG/ML
INJECTION INTRAMUSCULAR; INTRAVENOUS PRN
Status: DISCONTINUED | OUTPATIENT
Start: 2023-06-26 | End: 2023-06-26 | Stop reason: SDUPTHER

## 2023-06-26 RX ORDER — SODIUM CHLORIDE 0.9 % (FLUSH) 0.9 %
5-40 SYRINGE (ML) INJECTION EVERY 12 HOURS SCHEDULED
Status: DISCONTINUED | OUTPATIENT
Start: 2023-06-26 | End: 2023-06-27

## 2023-06-26 RX ORDER — OXYCODONE HYDROCHLORIDE 5 MG/1
10 TABLET ORAL EVERY 4 HOURS PRN
Status: DISCONTINUED | OUTPATIENT
Start: 2023-06-26 | End: 2023-06-27

## 2023-06-26 RX ORDER — EPHEDRINE SULFATE/0.9% NACL/PF 50 MG/5 ML
SYRINGE (ML) INTRAVENOUS PRN
Status: DISCONTINUED | OUTPATIENT
Start: 2023-06-26 | End: 2023-06-26 | Stop reason: SDUPTHER

## 2023-06-26 RX ORDER — KETOROLAC TROMETHAMINE 30 MG/ML
15 INJECTION, SOLUTION INTRAMUSCULAR; INTRAVENOUS EVERY 6 HOURS PRN
Status: DISCONTINUED | OUTPATIENT
Start: 2023-06-26 | End: 2023-06-27

## 2023-06-26 RX ORDER — SODIUM CHLORIDE, SODIUM LACTATE, POTASSIUM CHLORIDE, CALCIUM CHLORIDE 600; 310; 30; 20 MG/100ML; MG/100ML; MG/100ML; MG/100ML
INJECTION, SOLUTION INTRAVENOUS CONTINUOUS PRN
Status: DISCONTINUED | OUTPATIENT
Start: 2023-06-26 | End: 2023-06-26 | Stop reason: SDUPTHER

## 2023-06-26 RX ORDER — SODIUM CHLORIDE 0.9 % (FLUSH) 0.9 %
5-40 SYRINGE (ML) INJECTION PRN
Status: DISCONTINUED | OUTPATIENT
Start: 2023-06-26 | End: 2023-06-27

## 2023-06-26 RX ORDER — ONDANSETRON 2 MG/ML
4 INJECTION INTRAMUSCULAR; INTRAVENOUS EVERY 6 HOURS PRN
Status: DISCONTINUED | OUTPATIENT
Start: 2023-06-26 | End: 2023-06-27

## 2023-06-26 RX ORDER — TRISODIUM CITRATE DIHYDRATE AND CITRIC ACID MONOHYDRATE 500; 334 MG/5ML; MG/5ML
30 SOLUTION ORAL ONCE
Status: COMPLETED | OUTPATIENT
Start: 2023-06-26 | End: 2023-06-26

## 2023-06-26 RX ADMIN — PHENYLEPHRINE HYDROCHLORIDE 100 MCG: 0.1 INJECTION, SOLUTION INTRAVENOUS at 07:44

## 2023-06-26 RX ADMIN — SODIUM CHLORIDE, PRESERVATIVE FREE 10 ML: 5 INJECTION INTRAVENOUS at 21:32

## 2023-06-26 RX ADMIN — KETOROLAC TROMETHAMINE 15 MG: 30 INJECTION, SOLUTION INTRAMUSCULAR; INTRAVENOUS at 21:33

## 2023-06-26 RX ADMIN — Medication 10 MG: at 07:44

## 2023-06-26 RX ADMIN — TRANEXAMIC ACID 1000 MG: 10 INJECTION, SOLUTION INTRAVENOUS at 08:10

## 2023-06-26 RX ADMIN — KETOROLAC TROMETHAMINE 15 MG: 30 INJECTION, SOLUTION INTRAMUSCULAR; INTRAVENOUS at 15:37

## 2023-06-26 RX ADMIN — MORPHINE SULFATE 0.15 MG: 0.5 INJECTION, SOLUTION EPIDURAL; INTRATHECAL; INTRAVENOUS at 07:40

## 2023-06-26 RX ADMIN — ACETAMINOPHEN 650 MG: 325 TABLET, FILM COATED ORAL at 11:43

## 2023-06-26 RX ADMIN — SODIUM CHLORIDE, POTASSIUM CHLORIDE, SODIUM LACTATE AND CALCIUM CHLORIDE 1000 ML: 600; 310; 30; 20 INJECTION, SOLUTION INTRAVENOUS at 06:43

## 2023-06-26 RX ADMIN — SERTRALINE 150 MG: 100 TABLET, FILM COATED ORAL at 21:32

## 2023-06-26 RX ADMIN — SODIUM CITRATE AND CITRIC ACID MONOHYDRATE 30 ML: 500; 334 SOLUTION ORAL at 06:56

## 2023-06-26 RX ADMIN — CEFAZOLIN 2000 MG: 10 INJECTION, POWDER, FOR SOLUTION INTRAVENOUS at 07:04

## 2023-06-26 RX ADMIN — SODIUM CHLORIDE, SODIUM LACTATE, POTASSIUM CHLORIDE, AND CALCIUM CHLORIDE: 600; 310; 30; 20 INJECTION, SOLUTION INTRAVENOUS at 07:33

## 2023-06-26 RX ADMIN — PHENYLEPHRINE HYDROCHLORIDE 100 MCG: 0.1 INJECTION, SOLUTION INTRAVENOUS at 08:32

## 2023-06-26 RX ADMIN — PHENYLEPHRINE HYDROCHLORIDE 100 MCG: 0.1 INJECTION, SOLUTION INTRAVENOUS at 08:08

## 2023-06-26 RX ADMIN — BUPIVACAINE HYDROCHLORIDE IN DEXTROSE 13.5 MG: 7.5 INJECTION, SOLUTION SUBARACHNOID at 07:40

## 2023-06-26 RX ADMIN — PROCHLORPERAZINE EDISYLATE 5 MG: 5 INJECTION INTRAMUSCULAR; INTRAVENOUS at 08:37

## 2023-06-26 RX ADMIN — KETOROLAC TROMETHAMINE 30 MG: 30 INJECTION, SOLUTION INTRAMUSCULAR; INTRAVENOUS at 08:41

## 2023-06-26 RX ADMIN — Medication 500 ML/HR: at 08:04

## 2023-06-26 RX ADMIN — PHENYLEPHRINE HYDROCHLORIDE 100 MCG: 0.1 INJECTION, SOLUTION INTRAVENOUS at 07:41

## 2023-06-26 RX ADMIN — ONDANSETRON 4 MG: 2 INJECTION INTRAMUSCULAR; INTRAVENOUS at 07:36

## 2023-06-26 RX ADMIN — ACETAMINOPHEN 650 MG: 325 TABLET, FILM COATED ORAL at 17:50

## 2023-06-26 RX ADMIN — SODIUM CHLORIDE, POTASSIUM CHLORIDE, SODIUM LACTATE AND CALCIUM CHLORIDE: 600; 310; 30; 20 INJECTION, SOLUTION INTRAVENOUS at 11:44

## 2023-06-26 RX ADMIN — FAMOTIDINE 20 MG: 10 INJECTION, SOLUTION INTRAVENOUS at 06:56

## 2023-06-26 RX ADMIN — PHENYLEPHRINE HYDROCHLORIDE 100 MCG: 0.1 INJECTION, SOLUTION INTRAVENOUS at 08:29

## 2023-06-26 ASSESSMENT — PAIN DESCRIPTION - DESCRIPTORS
DESCRIPTORS: SORE

## 2023-06-26 ASSESSMENT — PAIN DESCRIPTION - ORIENTATION
ORIENTATION: LOWER;ANTERIOR
ORIENTATION: ANTERIOR;LOWER
ORIENTATION: LOWER;ANTERIOR

## 2023-06-26 ASSESSMENT — PAIN SCALES - GENERAL
PAINLEVEL_OUTOF10: 5
PAINLEVEL_OUTOF10: 4
PAINLEVEL_OUTOF10: 4

## 2023-06-26 ASSESSMENT — PAIN DESCRIPTION - LOCATION
LOCATION: INCISION
LOCATION: ABDOMEN
LOCATION: ABDOMEN

## 2023-06-27 ENCOUNTER — ANESTHESIA (OUTPATIENT)
Dept: MOTHER INFANT UNIT | Age: 48
End: 2023-06-27

## 2023-06-27 ENCOUNTER — ANESTHESIA EVENT (OUTPATIENT)
Dept: MOTHER INFANT UNIT | Age: 48
End: 2023-06-27

## 2023-06-27 PROBLEM — O34.219 PREVIOUS CESAREAN DELIVERY AFFECTING PREGNANCY: Status: ACTIVE | Noted: 2023-06-27

## 2023-06-27 LAB — HGB BLD-MCNC: 9.1 G/DL (ref 11.7–15.4)

## 2023-06-27 PROCEDURE — 1100000000 HC RM PRIVATE

## 2023-06-27 PROCEDURE — 85018 HEMOGLOBIN: CPT

## 2023-06-27 PROCEDURE — 6360000002 HC RX W HCPCS: Performed by: ANESTHESIOLOGY

## 2023-06-27 PROCEDURE — 36415 COLL VENOUS BLD VENIPUNCTURE: CPT

## 2023-06-27 PROCEDURE — 6370000000 HC RX 637 (ALT 250 FOR IP): Performed by: OBSTETRICS & GYNECOLOGY

## 2023-06-27 RX ORDER — FAMOTIDINE 20 MG/1
20 TABLET, FILM COATED ORAL 2 TIMES DAILY
Status: DISCONTINUED | OUTPATIENT
Start: 2023-06-27 | End: 2023-06-29 | Stop reason: HOSPADM

## 2023-06-27 RX ORDER — SODIUM CHLORIDE 0.9 % (FLUSH) 0.9 %
5-40 SYRINGE (ML) INJECTION EVERY 12 HOURS SCHEDULED
Status: DISCONTINUED | OUTPATIENT
Start: 2023-06-27 | End: 2023-06-29

## 2023-06-27 RX ORDER — ACETAMINOPHEN 500 MG
1000 TABLET ORAL EVERY 6 HOURS
Status: DISCONTINUED | OUTPATIENT
Start: 2023-06-27 | End: 2023-06-29 | Stop reason: HOSPADM

## 2023-06-27 RX ORDER — SIMETHICONE 80 MG
80 TABLET,CHEWABLE ORAL EVERY 6 HOURS PRN
Status: DISCONTINUED | OUTPATIENT
Start: 2023-06-27 | End: 2023-06-29 | Stop reason: HOSPADM

## 2023-06-27 RX ORDER — DIPHENHYDRAMINE HYDROCHLORIDE 50 MG/ML
25 INJECTION INTRAMUSCULAR; INTRAVENOUS EVERY 6 HOURS PRN
Status: DISCONTINUED | OUTPATIENT
Start: 2023-06-27 | End: 2023-06-29 | Stop reason: HOSPADM

## 2023-06-27 RX ORDER — DOCUSATE SODIUM 100 MG/1
100 CAPSULE, LIQUID FILLED ORAL 2 TIMES DAILY
Status: DISCONTINUED | OUTPATIENT
Start: 2023-06-27 | End: 2023-06-29 | Stop reason: HOSPADM

## 2023-06-27 RX ORDER — ONDANSETRON 2 MG/ML
4 INJECTION INTRAMUSCULAR; INTRAVENOUS EVERY 6 HOURS PRN
Status: DISCONTINUED | OUTPATIENT
Start: 2023-06-27 | End: 2023-06-29 | Stop reason: HOSPADM

## 2023-06-27 RX ORDER — SODIUM CHLORIDE, SODIUM LACTATE, POTASSIUM CHLORIDE, CALCIUM CHLORIDE 600; 310; 30; 20 MG/100ML; MG/100ML; MG/100ML; MG/100ML
INJECTION, SOLUTION INTRAVENOUS CONTINUOUS
Status: DISCONTINUED | OUTPATIENT
Start: 2023-06-27 | End: 2023-06-27

## 2023-06-27 RX ORDER — PRENATAL VIT/IRON FUM/FOLIC AC 27MG-0.8MG
1 TABLET ORAL DAILY
Status: DISCONTINUED | OUTPATIENT
Start: 2023-06-27 | End: 2023-06-29 | Stop reason: HOSPADM

## 2023-06-27 RX ORDER — SODIUM CHLORIDE 9 MG/ML
INJECTION, SOLUTION INTRAVENOUS PRN
Status: DISCONTINUED | OUTPATIENT
Start: 2023-06-27 | End: 2023-06-27

## 2023-06-27 RX ORDER — NALOXONE HYDROCHLORIDE 0.4 MG/ML
0.4 INJECTION, SOLUTION INTRAMUSCULAR; INTRAVENOUS; SUBCUTANEOUS PRN
Status: DISCONTINUED | OUTPATIENT
Start: 2023-06-27 | End: 2023-06-29 | Stop reason: HOSPADM

## 2023-06-27 RX ORDER — IBUPROFEN 800 MG/1
800 TABLET ORAL EVERY 6 HOURS
Status: DISCONTINUED | OUTPATIENT
Start: 2023-06-27 | End: 2023-06-29 | Stop reason: HOSPADM

## 2023-06-27 RX ORDER — OXYCODONE HYDROCHLORIDE 5 MG/1
10 TABLET ORAL EVERY 4 HOURS PRN
Status: DISCONTINUED | OUTPATIENT
Start: 2023-06-27 | End: 2023-06-29 | Stop reason: HOSPADM

## 2023-06-27 RX ORDER — SODIUM CHLORIDE 0.9 % (FLUSH) 0.9 %
5-40 SYRINGE (ML) INJECTION PRN
Status: DISCONTINUED | OUTPATIENT
Start: 2023-06-27 | End: 2023-06-29

## 2023-06-27 RX ORDER — LANOLIN
CREAM (ML) TOPICAL
Status: DISCONTINUED | OUTPATIENT
Start: 2023-06-27 | End: 2023-06-29 | Stop reason: HOSPADM

## 2023-06-27 RX ORDER — ONDANSETRON 8 MG/1
8 TABLET, ORALLY DISINTEGRATING ORAL EVERY 8 HOURS PRN
Status: DISCONTINUED | OUTPATIENT
Start: 2023-06-27 | End: 2023-06-29 | Stop reason: HOSPADM

## 2023-06-27 RX ORDER — POLYETHYLENE GLYCOL 3350 17 G/17G
17 POWDER, FOR SOLUTION ORAL DAILY
Status: DISCONTINUED | OUTPATIENT
Start: 2023-06-27 | End: 2023-06-29 | Stop reason: HOSPADM

## 2023-06-27 RX ORDER — OXYCODONE HYDROCHLORIDE 5 MG/1
5 TABLET ORAL EVERY 4 HOURS PRN
Status: DISCONTINUED | OUTPATIENT
Start: 2023-06-27 | End: 2023-06-29 | Stop reason: HOSPADM

## 2023-06-27 RX ADMIN — IBUPROFEN 800 MG: 800 TABLET, FILM COATED ORAL at 12:13

## 2023-06-27 RX ADMIN — PRENATAL VIT W/ FE FUMARATE-FA TAB 27-0.8 MG 1 TABLET: 27-0.8 TAB at 08:44

## 2023-06-27 RX ADMIN — DOCUSATE SODIUM 100 MG: 100 CAPSULE, LIQUID FILLED ORAL at 08:44

## 2023-06-27 RX ADMIN — ACETAMINOPHEN 1000 MG: 500 TABLET, FILM COATED ORAL at 21:42

## 2023-06-27 RX ADMIN — ACETAMINOPHEN 1000 MG: 500 TABLET, FILM COATED ORAL at 08:44

## 2023-06-27 RX ADMIN — SERTRALINE 150 MG: 100 TABLET, FILM COATED ORAL at 21:42

## 2023-06-27 RX ADMIN — SIMETHICONE 80 MG: 80 TABLET, CHEWABLE ORAL at 21:59

## 2023-06-27 RX ADMIN — KETOROLAC TROMETHAMINE 15 MG: 30 INJECTION, SOLUTION INTRAMUSCULAR; INTRAVENOUS at 03:40

## 2023-06-27 RX ADMIN — IBUPROFEN 800 MG: 800 TABLET, FILM COATED ORAL at 18:32

## 2023-06-27 RX ADMIN — FAMOTIDINE 20 MG: 20 TABLET, FILM COATED ORAL at 08:44

## 2023-06-27 RX ADMIN — POLYETHYLENE GLYCOL 3350 17 G: 17 POWDER, FOR SOLUTION ORAL at 08:44

## 2023-06-27 RX ADMIN — ACETAMINOPHEN 1000 MG: 500 TABLET, FILM COATED ORAL at 15:49

## 2023-06-27 RX ADMIN — FAMOTIDINE 20 MG: 20 TABLET, FILM COATED ORAL at 21:42

## 2023-06-27 RX ADMIN — DOCUSATE SODIUM 100 MG: 100 CAPSULE, LIQUID FILLED ORAL at 21:41

## 2023-06-27 ASSESSMENT — PAIN DESCRIPTION - DESCRIPTORS: DESCRIPTORS: SORE;TENDER

## 2023-06-27 ASSESSMENT — PAIN DESCRIPTION - LOCATION: LOCATION: INCISION

## 2023-06-27 ASSESSMENT — PAIN SCALES - GENERAL: PAINLEVEL_OUTOF10: 6

## 2023-06-28 PROCEDURE — 6370000000 HC RX 637 (ALT 250 FOR IP): Performed by: OBSTETRICS & GYNECOLOGY

## 2023-06-28 PROCEDURE — 1100000000 HC RM PRIVATE

## 2023-06-28 RX ORDER — IBUPROFEN 800 MG/1
800 TABLET ORAL EVERY 6 HOURS
Qty: 120 TABLET | Refills: 3 | Status: SHIPPED | OUTPATIENT
Start: 2023-06-28

## 2023-06-28 RX ORDER — BISACODYL 10 MG
10 SUPPOSITORY, RECTAL RECTAL ONCE
Status: COMPLETED | OUTPATIENT
Start: 2023-06-28 | End: 2023-06-28

## 2023-06-28 RX ORDER — OXYCODONE HYDROCHLORIDE 5 MG/1
5 TABLET ORAL EVERY 4 HOURS PRN
Qty: 20 TABLET | Refills: 0 | Status: SHIPPED | OUTPATIENT
Start: 2023-06-28 | End: 2023-07-01

## 2023-06-28 RX ADMIN — DOCUSATE SODIUM 100 MG: 100 CAPSULE, LIQUID FILLED ORAL at 21:31

## 2023-06-28 RX ADMIN — SIMETHICONE 80 MG: 80 TABLET, CHEWABLE ORAL at 17:35

## 2023-06-28 RX ADMIN — SERTRALINE 150 MG: 100 TABLET, FILM COATED ORAL at 21:31

## 2023-06-28 RX ADMIN — IBUPROFEN 800 MG: 800 TABLET, FILM COATED ORAL at 06:48

## 2023-06-28 RX ADMIN — PRENATAL VIT W/ FE FUMARATE-FA TAB 27-0.8 MG 1 TABLET: 27-0.8 TAB at 07:55

## 2023-06-28 RX ADMIN — FAMOTIDINE 20 MG: 20 TABLET, FILM COATED ORAL at 07:55

## 2023-06-28 RX ADMIN — SIMETHICONE 80 MG: 80 TABLET, CHEWABLE ORAL at 07:56

## 2023-06-28 RX ADMIN — IBUPROFEN 800 MG: 800 TABLET, FILM COATED ORAL at 18:50

## 2023-06-28 RX ADMIN — ACETAMINOPHEN 1000 MG: 500 TABLET, FILM COATED ORAL at 04:05

## 2023-06-28 RX ADMIN — FAMOTIDINE 20 MG: 20 TABLET, FILM COATED ORAL at 21:31

## 2023-06-28 RX ADMIN — ACETAMINOPHEN 1000 MG: 500 TABLET, FILM COATED ORAL at 10:31

## 2023-06-28 RX ADMIN — ACETAMINOPHEN 1000 MG: 500 TABLET, FILM COATED ORAL at 16:36

## 2023-06-28 RX ADMIN — IBUPROFEN 800 MG: 800 TABLET, FILM COATED ORAL at 01:22

## 2023-06-28 RX ADMIN — DOCUSATE SODIUM 100 MG: 100 CAPSULE, LIQUID FILLED ORAL at 07:56

## 2023-06-28 RX ADMIN — OXYCODONE HYDROCHLORIDE 5 MG: 5 TABLET ORAL at 17:35

## 2023-06-28 RX ADMIN — IBUPROFEN 800 MG: 800 TABLET, FILM COATED ORAL at 13:04

## 2023-06-28 RX ADMIN — ACETAMINOPHEN 1000 MG: 500 TABLET, FILM COATED ORAL at 21:31

## 2023-06-28 RX ADMIN — BISACODYL 10 MG: 10 SUPPOSITORY RECTAL at 09:23

## 2023-06-28 RX ADMIN — SIMETHICONE 80 MG: 80 TABLET, CHEWABLE ORAL at 13:08

## 2023-06-28 RX ADMIN — POLYETHYLENE GLYCOL 3350 17 G: 17 POWDER, FOR SOLUTION ORAL at 07:55

## 2023-06-28 ASSESSMENT — PAIN DESCRIPTION - LOCATION
LOCATION: ABDOMEN;INCISION
LOCATION: PERINEUM
LOCATION: ABDOMEN;INCISION
LOCATION: ABDOMEN;PERINEUM

## 2023-06-28 ASSESSMENT — PAIN DESCRIPTION - ORIENTATION
ORIENTATION: ANTERIOR;LOWER
ORIENTATION: ANTERIOR
ORIENTATION: ANTERIOR
ORIENTATION: ANTERIOR;LOWER

## 2023-06-28 ASSESSMENT — PAIN SCALES - GENERAL
PAINLEVEL_OUTOF10: 4
PAINLEVEL_OUTOF10: 4
PAINLEVEL_OUTOF10: 8
PAINLEVEL_OUTOF10: 8

## 2023-06-28 ASSESSMENT — PAIN DESCRIPTION - DESCRIPTORS
DESCRIPTORS: SORE;CRAMPING
DESCRIPTORS: ACHING;CRAMPING;SORE
DESCRIPTORS: ACHING;CRAMPING;SORE
DESCRIPTORS: CRAMPING;SORE

## 2023-06-28 ASSESSMENT — PAIN - FUNCTIONAL ASSESSMENT
PAIN_FUNCTIONAL_ASSESSMENT: ACTIVITIES ARE NOT PREVENTED

## 2023-06-29 VITALS
TEMPERATURE: 98.4 F | SYSTOLIC BLOOD PRESSURE: 115 MMHG | OXYGEN SATURATION: 99 % | RESPIRATION RATE: 16 BRPM | HEART RATE: 80 BPM | DIASTOLIC BLOOD PRESSURE: 75 MMHG

## 2023-06-29 PROCEDURE — 6370000000 HC RX 637 (ALT 250 FOR IP): Performed by: OBSTETRICS & GYNECOLOGY

## 2023-06-29 RX ADMIN — FAMOTIDINE 20 MG: 20 TABLET, FILM COATED ORAL at 09:08

## 2023-06-29 RX ADMIN — IBUPROFEN 800 MG: 800 TABLET, FILM COATED ORAL at 06:41

## 2023-06-29 RX ADMIN — DOCUSATE SODIUM 100 MG: 100 CAPSULE, LIQUID FILLED ORAL at 09:08

## 2023-06-29 RX ADMIN — ACETAMINOPHEN 1000 MG: 500 TABLET, FILM COATED ORAL at 04:00

## 2023-06-29 RX ADMIN — ACETAMINOPHEN 1000 MG: 500 TABLET, FILM COATED ORAL at 10:38

## 2023-06-29 RX ADMIN — IBUPROFEN 800 MG: 800 TABLET, FILM COATED ORAL at 00:54

## 2023-06-29 RX ADMIN — PRENATAL VIT W/ FE FUMARATE-FA TAB 27-0.8 MG 1 TABLET: 27-0.8 TAB at 09:07

## 2023-06-29 RX ADMIN — POLYETHYLENE GLYCOL 3350 17 G: 17 POWDER, FOR SOLUTION ORAL at 09:07

## 2023-06-29 RX ADMIN — SIMETHICONE 80 MG: 80 TABLET, CHEWABLE ORAL at 09:08

## 2023-06-29 ASSESSMENT — PAIN SCALES - GENERAL: PAINLEVEL_OUTOF10: 3

## 2023-06-29 ASSESSMENT — PAIN - FUNCTIONAL ASSESSMENT: PAIN_FUNCTIONAL_ASSESSMENT: ACTIVITIES ARE NOT PREVENTED

## 2023-06-29 ASSESSMENT — PAIN DESCRIPTION - LOCATION: LOCATION: ABDOMEN;INCISION

## 2023-06-29 ASSESSMENT — PAIN DESCRIPTION - ORIENTATION: ORIENTATION: ANTERIOR;LOWER

## 2023-06-29 ASSESSMENT — PAIN DESCRIPTION - DESCRIPTORS: DESCRIPTORS: ACHING;CRAMPING;SORE

## 2023-07-20 ENCOUNTER — FOLLOWUP TELEPHONE ENCOUNTER (OUTPATIENT)
Dept: CASE MANAGEMENT | Age: 48
End: 2023-07-20

## 2023-07-20 NOTE — TELEPHONE ENCOUNTER
Phone call to patient at [phone] due to EPDS score of 13 after delivery. Patient states that she and baby Patriciaann Snellen are \"doing really good. \"  Per patient, her Zoloft was recently increased, and she's \"doing a lot better. \"  Patient denied any ongoing concerns of postpartum depression/anxiety. Patient without any needs at this time and is receptive to another call in the future.     DEE Ramirez-CRISTOPHER, [address] [place] [address]   [phone]

## 2023-08-21 ENCOUNTER — FOLLOWUP TELEPHONE ENCOUNTER (OUTPATIENT)
Dept: CASE MANAGEMENT | Age: 48
End: 2023-08-21

## 2023-08-21 NOTE — TELEPHONE ENCOUNTER
Phone call to patient at [phone] due to EPDS score of 13 after delivery. Patient states that she and baby Brena Grow are \"doing great. \"  Per patient, she's \"feeling a lot better\" after her Zoloft was recently increased. Patient denied any needs at this time. SW encouraged patient to reach out if any needs/questions arise.      LEE Horn, [address] [place] [address]   [phone]

## 2023-11-27 RX ORDER — LEVOTHYROXINE SODIUM 0.1 MG/1
TABLET ORAL
Qty: 30 TABLET | Refills: 3 | OUTPATIENT
Start: 2023-11-27

## 2024-04-08 ENCOUNTER — TRANSCRIBE ORDERS (OUTPATIENT)
Dept: SCHEDULING | Age: 49
End: 2024-04-08

## 2024-04-08 DIAGNOSIS — Z12.31 SCREENING MAMMOGRAM FOR HIGH-RISK PATIENT: Primary | ICD-10-CM

## 2024-04-13 ENCOUNTER — HOSPITAL ENCOUNTER (OUTPATIENT)
Dept: MAMMOGRAPHY | Age: 49
Discharge: HOME | End: 2024-04-16

## 2024-04-13 DIAGNOSIS — Z12.31 SCREENING MAMMOGRAM FOR HIGH-RISK PATIENT: ICD-10-CM

## (undated) DEVICE — ELECTRODE PT RET AD L9FT HI MOIST COND ADH HYDRGEL CORDED

## (undated) DEVICE — SUTURE VCRL SZ 4-0 L18IN ABSRB UD L19MM PS-2 3/8 CIR PRIM J496H

## (undated) DEVICE — SUTURE VCRL SZ 0 L36IN ABSRB UD L36MM CT-1 1/2 CIR J946H

## (undated) DEVICE — BARRIER ADH W3XL4IN UTER PELV ABSRB GYNECARE INTCEED

## (undated) DEVICE — TRAY CATH 16FR PVC INTMIT STR TIP PREATTACH TO 1000ML COLL

## (undated) DEVICE — AMD ANTIMICROBIAL NON-ADHERENT PAD,0.2% POLYHEXAMETHYLENE BIGUANIDE HCI (PHMB): Brand: TELFA

## (undated) DEVICE — Device: Brand: PORTEX

## (undated) DEVICE — SUTURE VCRL SZ 2-0 L36IN ABSRB VLT L36MM CT-1 1/2 CIR J345H

## (undated) DEVICE — KENDALL SCD EXPRESS SLEEVES, KNEE LENGTH, MEDIUM: Brand: KENDALL SCD

## (undated) DEVICE — STERILE POLYISOPRENE POWDER-FREE SURGICAL GLOVES: Brand: PROTEXIS

## (undated) DEVICE — MEDI-VAC NON-CONDUCTIVE SUCTION TUBING: Brand: CARDINAL HEALTH

## (undated) DEVICE — SOLUTION IRRIG 1000ML H2O STRL BLT

## (undated) DEVICE — TRAY PREP DRY W/ PREM GLV 2 APPL 6 SPNG 2 UNDPD 1 OVERWRAP

## (undated) DEVICE — AMD ANTIMICROBIAL GAUZE SPONGES,12 PLY USP TYPE VII, 0.2% POLYHEXAMETHYLENE BIGUANIDE HCI (PHMB): Brand: CURITY

## (undated) DEVICE — REM POLYHESIVE ADULT PATIENT RETURN ELECTRODE: Brand: VALLEYLAB

## (undated) DEVICE — PREMIUM WET SKIN PREP TRAY: Brand: MEDLINE INDUSTRIES, INC.

## (undated) DEVICE — SURGICAL PROCEDURE PACK C SECT CDS

## (undated) DEVICE — SPONGE LAPAROTOMY W18XL18IN WHITE STRUNG RADIOPAQUE STERILE

## (undated) DEVICE — CARDINAL HEALTH FLEXIBLE LIGHT HANDLE COVER: Brand: CARDINAL HEALTH

## (undated) DEVICE — PENCIL ES L3M BTTN SWCH HOLSTER W/ BLDE ELECTRD EDGE

## (undated) DEVICE — SOLUTION IV 1000ML 0.9% SOD CHL

## (undated) DEVICE — SUTURE VCRL SZ 0 L36IN ABSRB UD CT-1 L36MM 1/2 CIR TAPR PNT VCP946H

## (undated) DEVICE — PENCIL ES L3M BTTN SWCH S STL HEX LOK BLDE ELECTRD HOLSTER

## (undated) DEVICE — SUTURE PLN GUT SZ 2-0 L27IN ABSRB YELLOWISH TAN L40MM CT 853H

## (undated) DEVICE — TUBING, SUCTION, 1/4" X 10', STRAIGHT: Brand: MEDLINE